# Patient Record
Sex: FEMALE | Race: WHITE | NOT HISPANIC OR LATINO | Employment: PART TIME | URBAN - METROPOLITAN AREA
[De-identification: names, ages, dates, MRNs, and addresses within clinical notes are randomized per-mention and may not be internally consistent; named-entity substitution may affect disease eponyms.]

---

## 2017-12-22 ENCOUNTER — OFFICE VISIT (OUTPATIENT)
Dept: URGENT CARE | Facility: CLINIC | Age: 52
End: 2017-12-22
Payer: COMMERCIAL

## 2017-12-22 VITALS
OXYGEN SATURATION: 98 % | SYSTOLIC BLOOD PRESSURE: 130 MMHG | TEMPERATURE: 98 F | BODY MASS INDEX: 26.46 KG/M2 | RESPIRATION RATE: 18 BRPM | HEART RATE: 78 BPM | WEIGHT: 155 LBS | DIASTOLIC BLOOD PRESSURE: 86 MMHG | HEIGHT: 64 IN

## 2017-12-22 DIAGNOSIS — J01.00 ACUTE NON-RECURRENT MAXILLARY SINUSITIS: Primary | ICD-10-CM

## 2017-12-22 PROCEDURE — 99214 OFFICE O/P EST MOD 30 MIN: CPT | Mod: 25,S$GLB,, | Performed by: SURGERY

## 2017-12-22 PROCEDURE — 96372 THER/PROPH/DIAG INJ SC/IM: CPT | Mod: S$GLB,,, | Performed by: SURGERY

## 2017-12-22 RX ORDER — PREDNISONE 20 MG/1
20 TABLET ORAL DAILY
Qty: 5 TABLET | Refills: 0 | Status: SHIPPED | OUTPATIENT
Start: 2017-12-23 | End: 2017-12-28

## 2017-12-22 RX ORDER — BETAMETHASONE SODIUM PHOSPHATE AND BETAMETHASONE ACETATE 3; 3 MG/ML; MG/ML
6 INJECTION, SUSPENSION INTRA-ARTICULAR; INTRALESIONAL; INTRAMUSCULAR; SOFT TISSUE
Status: COMPLETED | OUTPATIENT
Start: 2017-12-22 | End: 2017-12-22

## 2017-12-22 RX ORDER — AMOXICILLIN 875 MG/1
875 TABLET, FILM COATED ORAL 2 TIMES DAILY
Qty: 14 TABLET | Refills: 0 | Status: SHIPPED | OUTPATIENT
Start: 2017-12-22 | End: 2017-12-29

## 2017-12-22 RX ORDER — BROMPHENIRAMINE MALEATE, PSEUDOEPHEDRINE HYDROCHLORIDE, AND DEXTROMETHORPHAN HYDROBROMIDE 2; 30; 10 MG/5ML; MG/5ML; MG/5ML
10 SYRUP ORAL EVERY 4 HOURS PRN
Qty: 118 ML | Refills: 1 | Status: SHIPPED | OUTPATIENT
Start: 2017-12-22 | End: 2017-12-29

## 2017-12-22 RX ADMIN — BETAMETHASONE SODIUM PHOSPHATE AND BETAMETHASONE ACETATE 6 MG: 3; 3 INJECTION, SUSPENSION INTRA-ARTICULAR; INTRALESIONAL; INTRAMUSCULAR; SOFT TISSUE at 12:12

## 2017-12-22 NOTE — PATIENT INSTRUCTIONS
Sinusitis (Antibiotic Treatment)    The sinuses are air-filled spaces within the bones of the face. They connect to the inside of the nose. Sinusitis is an inflammation of the tissue lining the sinus cavity. Sinus inflammation can occur during a cold. It can also be due to allergies to pollens and other particles in the air. Sinusitis can cause symptoms of sinus congestion and fullness. A sinus infection causes fever, headache and facial pain. There is often green or yellow drainage from the nose or into the back of the throat (post-nasal drip). You have been given antibiotics to treat this condition.  Home care:  · Take the full course of antibiotics as instructed. Do not stop taking them, even if you feel better.  · Drink plenty of water, hot tea, and other liquids. This may help thin mucus. It also may promote sinus drainage.  · Heat may help soothe painful areas of the face. Use a towel soaked in hot water. Or,  the shower and direct the hot spray onto your face. Using a vaporizer along with a menthol rub at night may also help.   · An expectorant containing guaifenesin may help thin the mucus and promote drainage from the sinuses.  · Over-the-counter decongestants may be used unless a similar medicine was prescribed. Nasal sprays work the fastest. Use one that contains phenylephrine or oxymetazoline. First blow the nose gently. Then use the spray. Do not use these medicines more often than directed on the label or symptoms may get worse. You may also use tablets containing pseudoephedrine. Avoid products that combine ingredients, because side effects may be increased. Read labels. You can also ask the pharmacist for help. (NOTE: Persons with high blood pressure should not use decongestants. They can raise blood pressure.)  · Over-the-counter antihistamines may help if allergies contributed to your sinusitis.    · Do not use nasal rinses or irrigation during an acute sinus infection, unless told to by  your health care provider. Rinsing may spread the infection to other sinuses.  · Use acetaminophen or ibuprofen to control pain, unless another pain medicine was prescribed. (If you have chronic liver or kidney disease or ever had a stomach ulcer, talk with your doctor before using these medicines. Aspirin should never be used in anyone under 18 years of age who is ill with a fever. It may cause severe liver damage.)  · Don't smoke. This can worsen symptoms.  Follow-up care  Follow up with your healthcare provider or our staff if you are not improving within the next week.  When to seek medical advice  Call your healthcare provider if any of these occur:  · Facial pain or headache becoming more severe  · Stiff neck  · Unusual drowsiness or confusion  · Swelling of the forehead or eyelids  · Vision problems, including blurred or double vision  · Fever of 100.4ºF (38ºC) or higher, or as directed by your healthcare provider  · Seizure  · Breathing problems  · Symptoms not resolving within 10 days  Date Last Reviewed: 4/13/2015  © 1742-0043 [employer] [facility] [facility]. [address] [place] [address], [place] [address]. All rights reserved. This information is not intended as a substitute for professional medical care. Always follow your healthcare professional's instructions.

## 2017-12-22 NOTE — PROGRESS NOTES
"Subjective:       Patient ID: Randi Burger is a 52 y.o. female.    Vitals:  height is 5' 4" (1.626 m) and weight is 70.3 kg (155 lb). Her temperature is 98 °F (36.7 °C). Her blood pressure is 130/86 and her pulse is 78. Her respiration is 18 and oxygen saturation is 98%.     Chief Complaint: Sinus Problem    Sinus Problem   This is a new problem. The current episode started 1 to 4 weeks ago. The problem is unchanged. There has been no fever. Her pain is at a severity of 3/10. The pain is mild. Associated symptoms include congestion, coughing, headaches and a hoarse voice. Pertinent negatives include no chills, ear pain, shortness of breath or sore throat. Past treatments include oral decongestants. The treatment provided mild relief.     Review of Systems   Constitution: Positive for malaise/fatigue. Negative for chills and fever.   HENT: Positive for congestion and hoarse voice. Negative for ear pain and sore throat.    Eyes: Negative for discharge and redness.   Cardiovascular: Negative for chest pain, dyspnea on exertion and leg swelling.   Respiratory: Positive for cough and sputum production. Negative for shortness of breath and wheezing.    Musculoskeletal: Negative for myalgias.   Gastrointestinal: Negative for abdominal pain and nausea.   Neurological: Positive for headaches.       Objective:      Physical Exam   Constitutional: She is oriented to person, place, and time. She appears well-developed and well-nourished. She is cooperative.  Non-toxic appearance. She does not appear ill. No distress.   HENT:   Head: Normocephalic and atraumatic.   Right Ear: Hearing, tympanic membrane, external ear and ear canal normal.   Left Ear: Hearing, tympanic membrane, external ear and ear canal normal.   Nose: Mucosal edema and rhinorrhea present. No nasal deformity. No epistaxis. Right sinus exhibits maxillary sinus tenderness. Right sinus exhibits no frontal sinus tenderness. Left sinus exhibits maxillary sinus " tenderness. Left sinus exhibits no frontal sinus tenderness.   Mouth/Throat: Uvula is midline, oropharynx is clear and moist and mucous membranes are normal. No trismus in the jaw. Normal dentition. No uvula swelling. No posterior oropharyngeal erythema.   Purulent postnasal drip   Eyes: Conjunctivae and lids are normal. No scleral icterus.   Sclera clear bilat   Neck: Trachea normal, full passive range of motion without pain and phonation normal. Neck supple.   Cardiovascular: Normal rate, regular rhythm, normal heart sounds, intact distal pulses and normal pulses.    Pulmonary/Chest: Effort normal and breath sounds normal. No respiratory distress.   Frequent productive cough through exam     Abdominal: Soft. Normal appearance and bowel sounds are normal. She exhibits no distension. There is no tenderness.   Musculoskeletal: Normal range of motion. She exhibits no edema or deformity.   Neurological: She is alert and oriented to person, place, and time. She exhibits normal muscle tone. Coordination normal.   Skin: Skin is warm, dry and intact. She is not diaphoretic. No pallor.   Psychiatric: She has a normal mood and affect. Her speech is normal and behavior is normal. Judgment and thought content normal. Cognition and memory are normal.   Nursing note and vitals reviewed.      Assessment:       1. Acute non-recurrent maxillary sinusitis        Plan:         Acute non-recurrent maxillary sinusitis  -     betamethasone acetate-betamethasone sodium phosphate injection 6 mg; Inject 1 mL (6 mg total) into the muscle one time.  -     amoxicillin (AMOXIL) 875 MG tablet; Take 1 tablet (875 mg total) by mouth 2 (two) times daily.  Dispense: 14 tablet; Refill: 0  -     brompheniramine-pseudoeph-DM 2-30-10 mg/5 mL Syrp; Take 10 mLs by mouth every 4 (four) hours as needed.  Dispense: 118 mL; Refill: 1    Other orders  -     predniSONE (DELTASONE) 20 MG tablet; Take 1 tablet (20 mg total) by mouth once daily.  Dispense: 5  tablet; Refill: 0

## 2018-03-21 ENCOUNTER — TELEPHONE (OUTPATIENT)
Dept: FAMILY MEDICINE | Facility: CLINIC | Age: 53
End: 2018-03-21

## 2018-03-21 NOTE — TELEPHONE ENCOUNTER
----- Message from Jess Nye MA sent at 3/20/2018  3:12 PM CDT -----      ----- Message -----  From: Yvette Murillo  Sent: 3/20/2018   2:21 PM  To: Noe ARORA Staff    No. [phone]    Mrs. Burger is requesting to become a new patient of Dr. Liu.  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   Please call.

## 2018-03-29 ENCOUNTER — HOSPITAL ENCOUNTER (OUTPATIENT)
Dept: RADIOLOGY | Facility: HOSPITAL | Age: 53
Discharge: HOME | End: 2018-03-29
Attending: OBSTETRICS & GYNECOLOGY
Payer: COMMERCIAL

## 2018-03-29 ENCOUNTER — OFFICE VISIT (OUTPATIENT)
Dept: OBSTETRICS AND GYNECOLOGY | Facility: CLINIC | Age: 53
End: 2018-03-29
Payer: COMMERCIAL

## 2018-03-29 VITALS
BODY MASS INDEX: 28 KG/M2 | WEIGHT: 164 LBS | DIASTOLIC BLOOD PRESSURE: 68 MMHG | HEIGHT: 64 IN | SYSTOLIC BLOOD PRESSURE: 120 MMHG

## 2018-03-29 DIAGNOSIS — Z01.419 ENCOUNTER FOR GYNECOLOGICAL EXAMINATION: Primary | ICD-10-CM

## 2018-03-29 DIAGNOSIS — Z12.31 ENCOUNTER FOR SCREENING MAMMOGRAM FOR BREAST CANCER: ICD-10-CM

## 2018-03-29 PROCEDURE — 99999 PR PBB SHADOW E&M-EST. PATIENT-LVL II: CPT | Mod: PBBFAC,,, | Performed by: OBSTETRICS & GYNECOLOGY

## 2018-03-29 PROCEDURE — 77067 SCR MAMMO BI INCL CAD: CPT | Mod: TC

## 2018-03-29 PROCEDURE — 77063 BREAST TOMOSYNTHESIS BI: CPT | Mod: 26,,, | Performed by: RADIOLOGY

## 2018-03-29 PROCEDURE — 77067 SCR MAMMO BI INCL CAD: CPT | Mod: 26,,, | Performed by: RADIOLOGY

## 2018-03-29 PROCEDURE — 99396 PREV VISIT EST AGE 40-64: CPT | Mod: S$GLB,,, | Performed by: OBSTETRICS & GYNECOLOGY

## 2018-03-29 NOTE — PROGRESS NOTES
Subjective:       Patient ID: Randi Burger is a 52 y.o. female.    Chief Complaint:  Well Woman (mammo today )      History of Present Illness  - here for annual. Using Replens for vaginal dryness with some relief. C/o odor in bilateral groins when sweats; has used a little deodorant there with relief. Denies hot flashes/night sweats. Thinks Estrace cream may make her feel a little roach. Her coccyx was broken during a forceps delivery; patient has some pelvic/low back tightness.    History reviewed. No pertinent past medical history.    Past Surgical History:   Procedure Laterality Date    HYSTERECTOMY      @38yrs of age    TONSILLECTOMY           Current Outpatient Prescriptions:     calcium carbonate 400 mg (1,000 mg) Chew, once a day, Disp: , Rfl:     estradiol (ESTRACE) 0.01 % (0.1 mg/gram) vaginal cream, Place 1 g vaginally twice a week., Disp: 42.5 g, Rfl: 1    Review of patient's allergies indicates:  No Known Allergies    GYN & OB History  No LMP recorded (lmp unknown). Patient has had a hysterectomy.   Date of Last Pap: No result found    OB History    Para Term  AB Living   1 1 1     1   SAB TAB Ectopic Multiple Live Births           1      # Outcome Date GA Lbr Francisco J/2nd Weight Sex Delivery Anes PTL Lv   1 Term      Vag-Spont   MARRY          Social History     Social History    Marital status:      Spouse name: N/A    Number of children: N/A    Years of education: N/A     Occupational History    Not on file.     Social History Main Topics    Smoking status: Never Smoker    Smokeless tobacco: Never Used    Alcohol use Yes      Comment: socially     Drug use: No    Sexual activity: Yes     Partners: Male     Birth control/ protection: Surgical     Other Topics Concern    Not on file     Social History Narrative    No narrative on file       Family History   Problem Relation Age of Onset    Diabetes Maternal Grandmother     Coronary artery disease Father     Heart  "attack Father     Hypertension Father     Breast cancer Neg Hx     Colon cancer Neg Hx     Ovarian cancer Neg Hx        Review of Systems  Review of Systems   Respiratory: Negative for shortness of breath.    Cardiovascular: Negative for chest pain and palpitations.   Gastrointestinal: Negative for blood in stool, nausea and vomiting.   Genitourinary:        - see HPI   Skin: Negative for rash and wound.   Allergic/Immunologic: Negative for immunocompromised state.   Neurological: Negative for dizziness and syncope.   Hematological: Negative for adenopathy.   Psychiatric/Behavioral: Negative for behavioral problems.        Objective:     Vitals:    03/29/18 1018   BP: 120/68   Weight: 74.4 kg (164 lb 0.4 oz)   Height: 5' 4" (1.626 m)       Physical Exam:   Constitutional: She is oriented to person, place, and time. She appears well-developed and well-nourished.        Pulmonary/Chest: Right breast exhibits no mass, no nipple discharge, no skin change, no tenderness and no swelling. Left breast exhibits no mass, no nipple discharge, no skin change, no tenderness and no swelling. Breasts are symmetrical.        Abdominal: Soft. She exhibits no distension. There is no tenderness.     Genitourinary: Vagina normal. There is no tenderness or lesion on the right labia. There is no tenderness or lesion on the left labia. Uterus is absent. Right adnexum displays no mass, no tenderness and no fullness. Left adnexum displays no mass, no tenderness and no fullness. No vaginal discharge found. Cervix exhibits absence.           Musculoskeletal: Moves all extremeties.       Neurological: She is alert and oriented to person, place, and time.     Psychiatric: She has a normal mood and affect.        Assessment/ Plan:          Randi was seen today for well woman.    Diagnoses and all orders for this visit:    Encounter for gynecological examination    - reassured there's no vaginal discharge causing odor. Can use deodorant or a " cornstarch based powder for help with sweat/odor.  - reassured that small amounts of Estrace vaginal cream shouldn't be causing mood swings. Discussed using small amount on tip of her finger and massaging into posterior introitus once or twice weekly; patient verbalized understanding and will try it.  - discussed seeing a massage therapist or PT for low back/pelvic pain.    Follow-up in about 1 year (around 3/29/2019) for annual exam.

## 2018-04-02 RX ORDER — ESTRADIOL 0.1 MG/G
1 CREAM VAGINAL
Qty: 42.5 G | Refills: 6 | Status: SHIPPED | OUTPATIENT
Start: 2018-04-02 | End: 2019-04-12 | Stop reason: SDUPTHER

## 2018-05-21 ENCOUNTER — OFFICE VISIT (OUTPATIENT)
Dept: FAMILY MEDICINE | Facility: CLINIC | Age: 53
End: 2018-05-21
Payer: COMMERCIAL

## 2018-05-21 ENCOUNTER — LAB VISIT (OUTPATIENT)
Dept: LAB | Facility: HOSPITAL | Age: 53
End: 2018-05-21
Attending: FAMILY MEDICINE
Payer: COMMERCIAL

## 2018-05-21 VITALS
WEIGHT: 163.56 LBS | HEART RATE: 67 BPM | BODY MASS INDEX: 28.08 KG/M2 | OXYGEN SATURATION: 97 % | SYSTOLIC BLOOD PRESSURE: 118 MMHG | DIASTOLIC BLOOD PRESSURE: 70 MMHG

## 2018-05-21 DIAGNOSIS — Z23 NEED FOR DIPHTHERIA-TETANUS-PERTUSSIS (TDAP) VACCINE: ICD-10-CM

## 2018-05-21 DIAGNOSIS — Z11.59 NEED FOR HEPATITIS C SCREENING TEST: ICD-10-CM

## 2018-05-21 DIAGNOSIS — Z00.00 ANNUAL PHYSICAL EXAM: Primary | ICD-10-CM

## 2018-05-21 DIAGNOSIS — M25.50 ARTHRALGIA, UNSPECIFIED JOINT: ICD-10-CM

## 2018-05-21 DIAGNOSIS — Z12.11 ENCOUNTER FOR COLORECTAL CANCER SCREENING: ICD-10-CM

## 2018-05-21 DIAGNOSIS — Z12.12 ENCOUNTER FOR COLORECTAL CANCER SCREENING: ICD-10-CM

## 2018-05-21 DIAGNOSIS — Z00.00 ANNUAL PHYSICAL EXAM: ICD-10-CM

## 2018-05-21 LAB
ALBUMIN SERPL BCP-MCNC: 4 G/DL
ALP SERPL-CCNC: 84 U/L
ALT SERPL W/O P-5'-P-CCNC: 21 U/L
ANION GAP SERPL CALC-SCNC: 11 MMOL/L
AST SERPL-CCNC: 16 U/L
BASOPHILS # BLD AUTO: 0.02 K/UL
BASOPHILS NFR BLD: 0.3 %
BILIRUB SERPL-MCNC: 0.5 MG/DL
BUN SERPL-MCNC: 10 MG/DL
CALCIUM SERPL-MCNC: 10.1 MG/DL
CHLORIDE SERPL-SCNC: 104 MMOL/L
CHOLEST SERPL-MCNC: 174 MG/DL
CHOLEST/HDLC SERPL: 3.2 {RATIO}
CO2 SERPL-SCNC: 25 MMOL/L
CREAT SERPL-MCNC: 0.7 MG/DL
DIFFERENTIAL METHOD: ABNORMAL
EOSINOPHIL # BLD AUTO: 0.1 K/UL
EOSINOPHIL NFR BLD: 0.9 %
ERYTHROCYTE [DISTWIDTH] IN BLOOD BY AUTOMATED COUNT: 13.2 %
ERYTHROCYTE [SEDIMENTATION RATE] IN BLOOD BY WESTERGREN METHOD: 5 MM/HR
EST. GFR  (AFRICAN AMERICAN): >60 ML/MIN/1.73 M^2
EST. GFR  (NON AFRICAN AMERICAN): >60 ML/MIN/1.73 M^2
GLUCOSE SERPL-MCNC: 99 MG/DL
HCT VFR BLD AUTO: 43.2 %
HDLC SERPL-MCNC: 54 MG/DL
HDLC SERPL: 31 %
HGB BLD-MCNC: 13.8 G/DL
IMM GRANULOCYTES # BLD AUTO: 0.01 K/UL
IMM GRANULOCYTES NFR BLD AUTO: 0.1 %
LDLC SERPL CALC-MCNC: 104.6 MG/DL
LYMPHOCYTES # BLD AUTO: 1.7 K/UL
LYMPHOCYTES NFR BLD: 24.7 %
MCH RBC QN AUTO: 29.2 PG
MCHC RBC AUTO-ENTMCNC: 31.9 G/DL
MCV RBC AUTO: 91 FL
MONOCYTES # BLD AUTO: 0.5 K/UL
MONOCYTES NFR BLD: 7.9 %
NEUTROPHILS # BLD AUTO: 4.5 K/UL
NEUTROPHILS NFR BLD: 66.1 %
NONHDLC SERPL-MCNC: 120 MG/DL
NRBC BLD-RTO: 0 /100 WBC
PLATELET # BLD AUTO: 245 K/UL
PMV BLD AUTO: 11.6 FL
POTASSIUM SERPL-SCNC: 4 MMOL/L
PROT SERPL-MCNC: 7.3 G/DL
RBC # BLD AUTO: 4.73 M/UL
RHEUMATOID FACT SERPL-ACNC: 22 IU/ML
SODIUM SERPL-SCNC: 140 MMOL/L
TRIGL SERPL-MCNC: 77 MG/DL
TSH SERPL DL<=0.005 MIU/L-ACNC: 1.45 UIU/ML
WBC # BLD AUTO: 6.85 K/UL

## 2018-05-21 PROCEDURE — 86431 RHEUMATOID FACTOR QUANT: CPT

## 2018-05-21 PROCEDURE — 99386 PREV VISIT NEW AGE 40-64: CPT | Mod: 25,S$GLB,, | Performed by: FAMILY MEDICINE

## 2018-05-21 PROCEDURE — 90715 TDAP VACCINE 7 YRS/> IM: CPT | Mod: S$GLB,,, | Performed by: FAMILY MEDICINE

## 2018-05-21 PROCEDURE — 86706 HEP B SURFACE ANTIBODY: CPT

## 2018-05-21 PROCEDURE — 86038 ANTINUCLEAR ANTIBODIES: CPT

## 2018-05-21 PROCEDURE — 36415 COLL VENOUS BLD VENIPUNCTURE: CPT | Mod: PO

## 2018-05-21 PROCEDURE — 80053 COMPREHEN METABOLIC PANEL: CPT

## 2018-05-21 PROCEDURE — 99999 PR PBB SHADOW E&M-EST. PATIENT-LVL III: CPT | Mod: PBBFAC,,, | Performed by: FAMILY MEDICINE

## 2018-05-21 PROCEDURE — 84443 ASSAY THYROID STIM HORMONE: CPT

## 2018-05-21 PROCEDURE — 85025 COMPLETE CBC W/AUTO DIFF WBC: CPT

## 2018-05-21 PROCEDURE — 85651 RBC SED RATE NONAUTOMATED: CPT

## 2018-05-21 PROCEDURE — 80061 LIPID PANEL: CPT

## 2018-05-21 PROCEDURE — 86803 HEPATITIS C AB TEST: CPT

## 2018-05-21 PROCEDURE — 90471 IMMUNIZATION ADMIN: CPT | Mod: S$GLB,,, | Performed by: FAMILY MEDICINE

## 2018-05-21 RX ORDER — LANOLIN ALCOHOL/MO/W.PET/CERES
1 CREAM (GRAM) TOPICAL DAILY
COMMUNITY

## 2018-05-21 RX ORDER — MELOXICAM 7.5 MG/1
7.5 TABLET ORAL DAILY
Qty: 90 TABLET | Refills: 1 | Status: SHIPPED | OUTPATIENT
Start: 2018-05-21 | End: 2019-04-12 | Stop reason: SDUPTHER

## 2018-05-21 NOTE — PROGRESS NOTES
"Subjective:       Patient ID: Randi Burger is a 52 y.o. female.    Chief Complaint: Establish Care and Annual Exam    HPI   51 yo female presents for annual physical. 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 Pt notes that she has chronic "inflammation" which she thinks is due to arthritis. Pt s/p sacral fracture in 1996. Pain exacerbated by a fall 3 years ago. Pt notes swelling and pain of the knuckles. Also notes polyarthralgias. Has been seen by Neurology in the past and was prescribed Mobic which has improved the pain. Takes Aleve as needed.   Pt would like to have her Hep B immunity status reviewed as she works in direct care of patients.  Social History:  with a 22 year old son. Works as a respiratory therapist and Cardiology technician.  Health Maintenance: Tetanus 2006. Mammogram March 2018. Has never had colorectal cancer screening. S/p hysterectomy  Review of Systems   Constitutional: Negative for activity change, appetite change, chills, fever and unexpected weight change.   HENT: Negative for hearing loss.    Eyes: Negative for discharge and visual disturbance.        Wears glassses   Respiratory: Negative for cough, shortness of breath and wheezing.    Cardiovascular: Negative for chest pain, palpitations and leg swelling.   Gastrointestinal: Negative for abdominal pain, anal bleeding, blood in stool, constipation, diarrhea, nausea and vomiting.   Endocrine: Negative for cold intolerance, heat intolerance, polydipsia, polyphagia and polyuria.   Genitourinary: Negative for dysuria, hematuria and vaginal bleeding.   Musculoskeletal: Positive for arthralgias, joint swelling and myalgias. Negative for gait problem.   Skin: Negative for color change, rash and wound.   Allergic/Immunologic: Negative for environmental allergies, food allergies and immunocompromised state.   Neurological: Negative for dizziness, tremors, syncope, speech difficulty, weakness, light-headedness, numbness and " headaches.   Hematological: Does not bruise/bleed easily.   Psychiatric/Behavioral: Negative for dysphoric mood, sleep disturbance and suicidal ideas. The patient is not nervous/anxious.        Objective:      Physical Exam   Constitutional: She is oriented to person, place, and time. She appears well-developed and well-nourished. No distress.   HENT:   Head: Normocephalic and atraumatic.   Right Ear: Hearing, tympanic membrane, external ear and ear canal normal.   Left Ear: Hearing, tympanic membrane, external ear and ear canal normal.   Nose: Nose normal. Right sinus exhibits no maxillary sinus tenderness and no frontal sinus tenderness. Left sinus exhibits no maxillary sinus tenderness and no frontal sinus tenderness.   Mouth/Throat: Uvula is midline, oropharynx is clear and moist and mucous membranes are normal.   Eyes: Conjunctivae and EOM are normal. Pupils are equal, round, and reactive to light. Right eye exhibits no discharge. Left eye exhibits no discharge. No scleral icterus.   Neck: Normal range of motion. Neck supple. No JVD present. No thyromegaly present.   Cardiovascular: Normal rate, regular rhythm, normal heart sounds and intact distal pulses.    No murmur heard.  Pulmonary/Chest: Effort normal and breath sounds normal. She has no wheezes. She has no rales.   Abdominal: Soft. Bowel sounds are normal. She exhibits no mass. There is no tenderness.   Musculoskeletal: Normal range of motion. She exhibits no edema, tenderness or deformity.   Lymphadenopathy:     She has no cervical adenopathy.   Neurological: She is alert and oriented to person, place, and time. She displays normal reflexes. No cranial nerve deficit or sensory deficit. She exhibits normal muscle tone. Coordination normal.   Skin: Skin is warm and dry. No rash noted. She is not diaphoretic.   Psychiatric: She has a normal mood and affect.   Vitals reviewed.      Assessment:         See plan  Plan:       Randi was seen today for establish  care and annual exam.    Diagnoses and all orders for this visit:    Annual physical exam  -     Lipid panel; Future  -     Comprehensive metabolic panel; Future  -     CBC auto differential; Future  -     TSH; Future  -     Hepatitis B surface antibody; Future    BMI 28.0-28.9,adult  Weight loss advised. Dietary and exercise counseling done.    Arthralgia, unspecified joint  -     Sedimentation rate, manual; Future  -     Rheumatoid factor; Future  -     MONICA; Future  -     meloxicam (MOBIC) 7.5 MG tablet; Take 1 tablet (7.5 mg total) by mouth once daily.    Need for hepatitis C screening test  -     Hepatitis C antibody; Future    Encounter for colorectal cancer screening  -     Fecal Immunochemical Test (iFOBT); Future    Need for diphtheria-tetanus-pertussis (Tdap) vaccine  -     (In Office Administered) Tdap Vaccine    F/U in 6 months.

## 2018-05-22 LAB
ANA SER QL IF: NORMAL
HBV SURFACE AB SER-ACNC: POSITIVE M[IU]/ML
HCV AB SERPL QL IA: NEGATIVE

## 2018-05-28 ENCOUNTER — LAB VISIT (OUTPATIENT)
Dept: LAB | Facility: HOSPITAL | Age: 53
End: 2018-05-28
Attending: FAMILY MEDICINE
Payer: COMMERCIAL

## 2018-05-28 ENCOUNTER — TELEPHONE (OUTPATIENT)
Dept: FAMILY MEDICINE | Facility: CLINIC | Age: 53
End: 2018-05-28

## 2018-05-28 DIAGNOSIS — R76.8 ELEVATED RHEUMATOID FACTOR: Primary | ICD-10-CM

## 2018-05-28 DIAGNOSIS — Z12.11 ENCOUNTER FOR COLORECTAL CANCER SCREENING: ICD-10-CM

## 2018-05-28 DIAGNOSIS — Z12.12 ENCOUNTER FOR COLORECTAL CANCER SCREENING: ICD-10-CM

## 2018-05-28 LAB — HEMOCCULT STL QL IA: NEGATIVE

## 2018-05-28 PROCEDURE — 82274 ASSAY TEST FOR BLOOD FECAL: CPT

## 2018-05-28 NOTE — TELEPHONE ENCOUNTER
Left a message to discuss lab results.    Pt has elevated rheumatoid factor. Will refer to Rheumatology.

## 2018-06-04 ENCOUNTER — HOSPITAL ENCOUNTER (OUTPATIENT)
Dept: RADIOLOGY | Facility: HOSPITAL | Age: 53
Discharge: HOME | End: 2018-06-04
Attending: INTERNAL MEDICINE
Payer: COMMERCIAL

## 2018-06-04 ENCOUNTER — PATIENT MESSAGE (OUTPATIENT)
Dept: RHEUMATOLOGY | Facility: CLINIC | Age: 53
End: 2018-06-04

## 2018-06-04 ENCOUNTER — INITIAL CONSULT (OUTPATIENT)
Dept: RHEUMATOLOGY | Facility: CLINIC | Age: 53
End: 2018-06-04
Attending: INTERNAL MEDICINE
Payer: COMMERCIAL

## 2018-06-04 VITALS
WEIGHT: 163.38 LBS | HEIGHT: 60 IN | BODY MASS INDEX: 32.08 KG/M2 | HEART RATE: 69 BPM | DIASTOLIC BLOOD PRESSURE: 74 MMHG | RESPIRATION RATE: 18 BRPM | SYSTOLIC BLOOD PRESSURE: 127 MMHG

## 2018-06-04 DIAGNOSIS — R76.8 RHEUMATOID FACTOR POSITIVE: ICD-10-CM

## 2018-06-04 DIAGNOSIS — R76.8 RHEUMATOID FACTOR POSITIVE: Primary | ICD-10-CM

## 2018-06-04 PROCEDURE — 99999 PR PBB SHADOW E&M-EST. PATIENT-LVL III: CPT | Mod: PBBFAC,,, | Performed by: INTERNAL MEDICINE

## 2018-06-04 PROCEDURE — 99204 OFFICE O/P NEW MOD 45 MIN: CPT | Mod: S$GLB,,, | Performed by: INTERNAL MEDICINE

## 2018-06-04 PROCEDURE — 3008F BODY MASS INDEX DOCD: CPT | Mod: CPTII,S$GLB,, | Performed by: INTERNAL MEDICINE

## 2018-06-04 PROCEDURE — 77077 JOINT SURVEY SINGLE VIEW: CPT | Mod: 26,,, | Performed by: RADIOLOGY

## 2018-06-04 PROCEDURE — 77077 JOINT SURVEY SINGLE VIEW: CPT | Mod: TC

## 2018-06-04 NOTE — LETTER
June 4, 2018      Hanny Liu MD  [address] [facility] [address]           [facility] - Rheumatology  [address]  [place] [address]  Phone: [phone]  Fax: [phone]          Patient: Randi Burger   MR Number: [id number]   YOB: 1965   Date of Visit: 6/4/2018       Dear Dr. Hanny Liu:    Thank you for referring Randi Burger to me for evaluation. Attached you will find relevant portions of my assessment and plan of care.    If you have questions, please do not hesitate to call me. I look forward to following Randi Burger along with you.    Sincerely,    Marisa Horan MD    Enclosure  CC:  No Recipients    If you would like to receive this communication electronically, please contact [email or web address] or [phone] to request more information on [employer] access.    For providers and/or their staff who would like to refer a patient to [facility], please contact us through our one-stop-shop provider referral line, [facility], at [phone].    If you feel you have received this communication in error or would no longer like to receive these types of communications, please e-mail [email or web address]

## 2018-06-04 NOTE — PROGRESS NOTES
Subjective:       Patient ID: Randi Burger is a 52 y.o. female.    Chief Complaint: Disease Management    HPI     53 yo F with PMH of anxiety sacral bone fracture at age 30 here for evaluation.   Reports that she has pain in arms and legs and buttock for 3 years.  Reports that she has pain in wrists, hands, ankles for a few year.  Reports that her her pain worsened after she had a flu shot.  She has episodes of fatigue on occasion. Pain level is about  3/10 today but can be as high as 7/10.  Pain in joints is aching and in muscles in burning.   Hands get swollen on occasion. Wrists also gets swelling. Ankles also get swollen.  Denies rashes, oral ulcers, fevers, photosensitivity, pleurisy, or raynauds.   Mobic improves the pain.   Has tried muscle relaxants but it gives her bladder infections.        Review of Systems   Constitutional: Negative for activity change, appetite change, chills, diaphoresis, fatigue, fever and unexpected weight change.   HENT: Negative for congestion, ear discharge, ear pain, facial swelling, mouth sores, sinus pressure, sneezing, sore throat, tinnitus and trouble swallowing.    Eyes: Negative for photophobia, pain, discharge, redness, itching and visual disturbance.   Respiratory: Negative for apnea, cough, chest tightness, shortness of breath, wheezing and stridor.    Cardiovascular: Negative for chest pain and leg swelling.   Gastrointestinal: Negative for abdominal distention, abdominal pain, anal bleeding, blood in stool, constipation and nausea.   Endocrine: Negative for cold intolerance and heat intolerance.   Genitourinary: Negative for difficulty urinating, dysuria and genital sores.   Musculoskeletal: Positive for arthralgias. Negative for back pain, gait problem, joint swelling, myalgias, neck pain and neck stiffness.   Skin: Negative for color change, pallor, rash and wound.   Neurological: Positive for headaches. Negative for dizziness, seizures, light-headedness and  numbness.   Hematological: Negative for adenopathy. Does not bruise/bleed easily.   Psychiatric/Behavioral: Negative for sleep disturbance. The patient is not nervous/anxious.            Objective:   /74   Pulse 69   Resp 18   Ht 5' (1.524 m)   Wt 74.1 kg (163 lb 6.4 oz)   LMP  (LMP Unknown)   BMI 31.91 kg/m²      Physical Exam   Constitutional: She is oriented to person, place, and time.   HENT:   Head: Normocephalic and atraumatic.   Right Ear: External ear normal.   Left Ear: External ear normal.   Nose: Nose normal.   Mouth/Throat: Oropharynx is clear and moist. No oropharyngeal exudate.   Eyes: Conjunctivae and EOM are normal. Pupils are equal, round, and reactive to light. Right eye exhibits no discharge. Left eye exhibits no discharge. No scleral icterus.   Neck: Neck supple. No JVD present. No thyromegaly present.   Cardiovascular: Normal rate, regular rhythm, normal heart sounds and intact distal pulses.  Exam reveals no gallop and no friction rub.    No murmur heard.  Pulmonary/Chest: Effort normal and breath sounds normal. No respiratory distress. She has no wheezes. She has no rales. She exhibits no tenderness.   Abdominal: Soft. Bowel sounds are normal. She exhibits no distension and no mass. There is no tenderness. There is no rebound and no guarding.   Lymphadenopathy:     She has no cervical adenopathy.   Neurological: She is alert and oriented to person, place, and time. No cranial nerve deficit. Gait normal. Coordination normal.   Skin: Skin is dry. No rash noted. No erythema. No pallor.     Psychiatric: Affect and judgment normal.   Musculoskeletal: She exhibits no edema, tenderness or deformity.   FROM of all joints including neck, shoulders, spine, ankles, wrists, knees, and ankles; no joint deformities noted or effusions, erythema or warmth; no tophi or nodules noted; no crepitus; no synovitis noted in hands or feet; no nail pitting or onycholysis         diffuse tender out of  proportion to exam         Assessment:     53 yo F with PMH of anxiety sacral bone fracture at age 30 here for evaluation.      1) Fibromyalgia: Patient with diffuse body pain and diffuse tender points. Discussed treatment of fibromyalgia.  Patient will try exercise and weight loss as she is not interested in additional pills.    2) +RF: on exam, I do not detect synovitis. Told patient I would get additional studies and xrays but have low suspicion.  Labs  xrays    3)obesity:  Encourage weight loss

## 2018-08-14 ENCOUNTER — OFFICE VISIT (OUTPATIENT)
Dept: INTERNAL MEDICINE | Facility: CLINIC | Age: 53
End: 2018-08-14
Payer: COMMERCIAL

## 2018-08-14 VITALS
WEIGHT: 163.56 LBS | HEART RATE: 84 BPM | SYSTOLIC BLOOD PRESSURE: 94 MMHG | OXYGEN SATURATION: 99 % | BODY MASS INDEX: 32.11 KG/M2 | DIASTOLIC BLOOD PRESSURE: 60 MMHG | HEIGHT: 60 IN

## 2018-08-14 DIAGNOSIS — R39.89 SUSPECTED UTI: Primary | ICD-10-CM

## 2018-08-14 LAB
BACTERIA #/AREA URNS AUTO: NORMAL /HPF
BILIRUB UR QL STRIP: ABNORMAL
CLARITY UR: ABNORMAL
COLOR UR: ABNORMAL
GLUCOSE UR QL STRIP: NEGATIVE
HGB UR QL STRIP: ABNORMAL
KETONES UR QL STRIP: NEGATIVE
LEUKOCYTE ESTERASE UR QL STRIP: ABNORMAL
MICROSCOPIC COMMENT: NORMAL
NITRITE UR QL STRIP: ABNORMAL
PH UR STRIP: 5 [PH] (ref 5–8)
PROT UR QL STRIP: ABNORMAL
RBC #/AREA URNS AUTO: 0 /HPF (ref 0–4)
SP GR UR STRIP: 1.01 (ref 1–1.03)
SQUAMOUS #/AREA URNS AUTO: 2 /HPF
URN SPEC COLLECT METH UR: ABNORMAL
UROBILINOGEN UR STRIP-ACNC: ABNORMAL EU/DL
WBC #/AREA URNS AUTO: 3 /HPF (ref 0–5)

## 2018-08-14 PROCEDURE — 99213 OFFICE O/P EST LOW 20 MIN: CPT | Mod: S$GLB,,, | Performed by: INTERNAL MEDICINE

## 2018-08-14 PROCEDURE — 81000 URINALYSIS NONAUTO W/SCOPE: CPT | Mod: PO

## 2018-08-14 PROCEDURE — 99999 PR PBB SHADOW E&M-EST. PATIENT-LVL IV: CPT | Mod: PBBFAC,,, | Performed by: INTERNAL MEDICINE

## 2018-08-14 PROCEDURE — 3008F BODY MASS INDEX DOCD: CPT | Mod: CPTII,S$GLB,, | Performed by: INTERNAL MEDICINE

## 2018-08-14 RX ORDER — CIPROFLOXACIN 250 MG/1
250 TABLET, FILM COATED ORAL EVERY 12 HOURS
Qty: 6 TABLET | Refills: 0 | Status: SHIPPED | OUTPATIENT
Start: 2018-08-14 | End: 2018-08-17

## 2018-08-14 RX ORDER — CIPROFLOXACIN 250 MG/1
250 TABLET, FILM COATED ORAL EVERY 12 HOURS
Qty: 6 TABLET | Refills: 0 | Status: SHIPPED | OUTPATIENT
Start: 2018-08-14 | End: 2018-08-14 | Stop reason: SDUPTHER

## 2018-08-14 NOTE — PATIENT INSTRUCTIONS
Ciprofloxacin tablets  What is this medicine?  CIPROFLOXACIN (sip juan FLOX a sin) is a quinolone antibiotic. It is used to treat certain kinds of bacterial infections. It will not work for colds, flu, or other viral infections.  How should I use this medicine?  Take this medicine by mouth with a glass of water. Follow the directions on the prescription label. Take your medicine at regular intervals. Do not take your medicine more often than directed. Take all of your medicine as directed even if you think your are better. Do not skip doses or stop your medicine early.  You can take this medicine with food or on an empty stomach. It can be taken with a meal that contains dairy or calcium, but do not take it alone with a dairy product, like milk or yogurt or calcium-fortified juice.  A special MedGuide will be given to you by the pharmacist with each prescription and refill. Be sure to read this information carefully each time.  Talk to your pediatrician regarding the use of this medicine in children. Special care may be needed.  What side effects may I notice from receiving this medicine?  Side effects that you should report to your doctor or health care professional as soon as possible:  · allergic reactions like skin rash or hives, swelling of the face, lips, or tongue  · anxious  · confusion  · depressed mood  · diarrhea  · fast, irregular heartbeat  · hallucination, loss of contact with reality  · joint, muscle, or tendon pain or swelling  · pain, tingling, numbness in the hands or feet  · suicidal thoughts or other mood changes  · sunburn  · unusually weak or tired  Side effects that usually do not require medical attention (report to your doctor or health care professional if they continue or are bothersome):  · dry mouth  · headache  · nausea  · trouble sleeping  What may interact with this medicine?  Do not take this medicine with any of the following  medications:  · cisapride  · droperidol  · terfenadine  · tizanidine  This medicine may also interact with the following medications:  · antacids  · birth control pills  · caffeine  · cyclosporin  · didanosine (ddI) buffered tablets or powder  · medicines for diabetes  · medicines for inflammation like ibuprofen, naproxen  · methotrexate  · multivitamins  · omeprazole  · phenytoin  · probenecid  · sucralfate  · theophylline  · warfarin  What if I miss a dose?  If you miss a dose, take it as soon as you can. If it is almost time for your next dose, take only that dose. Do not take double or extra doses.  Where should I keep my medicine?  Keep out of the reach of children.  Store at room temperature below 30 degrees C (86 degrees F). Keep container tightly closed. Throw away any unused medicine after the expiration date.  What should I tell my health care provider before I take this medicine?  They need to know if you have any of these conditions:  · bone problems  · cerebral disease  · history of low levels of potassium in the blood  · joint problems  · irregular heartbeat  · kidney disease  · myasthenia gravis  · seizures  · tendon problems  · tingling of the fingers or toes, or other nerve disorder  · an unusual or allergic reaction to ciprofloxacin, other antibiotics or medicines, foods, dyes, or preservatives  · pregnant or trying to get pregnant  · breast-feeding  What should I watch for while using this medicine?  Tell your doctor or health care professional if your symptoms do not improve.  Do not treat diarrhea with over the counter products. Contact your doctor if you have diarrhea that lasts more than 2 days or if it is severe and watery.  You may get drowsy or dizzy. Do not drive, use machinery, or do anything that needs mental alertness until you know how this medicine affects you. Do not stand or sit up quickly, especially if you are an older patient. This reduces the risk of dizzy or fainting  spells.  This medicine can make you more sensitive to the sun. Keep out of the sun. If you cannot avoid being in the sun, wear protective clothing and use sunscreen. Do not use sun lamps or tanning beds/booths.  Avoid antacids, aluminum, calcium, iron, magnesium, and zinc products for 6 hours before and 2 hours after taking a dose of this medicine.  NOTE:This sheet is a summary. It may not cover all possible information. If you have questions about this medicine, talk to your doctor, pharmacist, or health care provider. Copyright© 2017 [employer]

## 2018-08-14 NOTE — PROGRESS NOTES
Subjective:       Patient ID: Randi Burger is a 52 y.o. female.    Chief Complaint: Urinary Tract Infection (x one week)    HPI Mrs. Burger is a 52 year old female who presents with a chief complaint of urinary tract infection.  She says that over the weekend she was drinking citric.  She also had sex and had 2 margaritas.  She is wondering if this may have been what started her urinary tract infection.  Her symptoms worsened and became really bad last night.  She passed a blood clot once.  She did not have any further episodes of bleeding.  She describes an achy type pain in the lower pelvic region.  She also feels some irritation in the lower back.  She had a possible fever last night (she did not check a temperature).  She took some Tylenol and thinks this may have helped some.  She has worsening of pain with urination.  She also has associated urinary frequency and urgency.  Symptoms are constant and worsening.    Review of Systems   Constitutional: Positive for fever (possible fever).   Genitourinary: Positive for dysuria, frequency, hematuria, pelvic pain and urgency.       Objective:      Physical Exam   Constitutional: She is oriented to person, place, and time. She appears well-developed and well-nourished. No distress.   HENT:   Head: Normocephalic and atraumatic.   Eyes: Conjunctivae and EOM are normal.   Cardiovascular: Normal rate, regular rhythm, normal heart sounds and intact distal pulses. Exam reveals no gallop and no friction rub.   No murmur heard.  Pulmonary/Chest: Effort normal and breath sounds normal. No stridor. No respiratory distress. She has no wheezes. She has no rales.   Abdominal: Soft. Bowel sounds are normal. She exhibits no distension and no mass. There is no tenderness. There is no rebound and no guarding.   Neurological: She is alert and oriented to person, place, and time.   Skin: Skin is warm and dry. She is not diaphoretic.   Psychiatric: She has a normal mood and  affect. Her behavior is normal. Judgment and thought content normal.   Vitals reviewed.      Assessment:       1. Suspected UTI        Plan:     1.  Suspected urinary tract infection  Urinalysis and urine culture pending. Unable to get dipstick UA due to recent use of AZO  Empirically treating with ciprofloxacin 250 mg p.o. b.i.d. x3 days  Continue OTC AZO as needed for pain relief  Continue increased intake of water    RTC PRN

## 2019-03-12 ENCOUNTER — TELEPHONE (OUTPATIENT)
Dept: OBSTETRICS AND GYNECOLOGY | Facility: CLINIC | Age: 54
End: 2019-03-12

## 2019-03-12 DIAGNOSIS — Z12.31 ENCOUNTER FOR SCREENING MAMMOGRAM FOR MALIGNANT NEOPLASM OF BREAST: Primary | ICD-10-CM

## 2019-03-12 NOTE — TELEPHONE ENCOUNTER
Pt has annual scheduled on 4/12 and would like mammo orders entered so she can have it done at [facility] before her appt.

## 2019-04-04 ENCOUNTER — HOSPITAL ENCOUNTER (OUTPATIENT)
Dept: RADIOLOGY | Facility: HOSPITAL | Age: 54
Discharge: HOME | End: 2019-04-04
Attending: OBSTETRICS & GYNECOLOGY
Payer: COMMERCIAL

## 2019-04-04 DIAGNOSIS — Z12.31 ENCOUNTER FOR SCREENING MAMMOGRAM FOR MALIGNANT NEOPLASM OF BREAST: ICD-10-CM

## 2019-04-04 PROCEDURE — 77067 SCR MAMMO BI INCL CAD: CPT | Mod: 26,,, | Performed by: RADIOLOGY

## 2019-04-04 PROCEDURE — 77067 MAMMO DIGITAL SCREENING BILAT WITH TOMOSYNTHESIS_CAD: ICD-10-PCS | Mod: 26,,, | Performed by: RADIOLOGY

## 2019-04-04 PROCEDURE — 77063 BREAST TOMOSYNTHESIS BI: CPT | Mod: 26,,, | Performed by: RADIOLOGY

## 2019-04-04 PROCEDURE — 77063 MAMMO DIGITAL SCREENING BILAT WITH TOMOSYNTHESIS_CAD: ICD-10-PCS | Mod: 26,,, | Performed by: RADIOLOGY

## 2019-04-04 PROCEDURE — 77067 SCR MAMMO BI INCL CAD: CPT | Mod: TC

## 2019-04-12 ENCOUNTER — OFFICE VISIT (OUTPATIENT)
Dept: OBSTETRICS AND GYNECOLOGY | Facility: CLINIC | Age: 54
End: 2019-04-12
Payer: COMMERCIAL

## 2019-04-12 VITALS
SYSTOLIC BLOOD PRESSURE: 112 MMHG | BODY MASS INDEX: 31.62 KG/M2 | DIASTOLIC BLOOD PRESSURE: 72 MMHG | WEIGHT: 161.06 LBS | HEIGHT: 60 IN

## 2019-04-12 DIAGNOSIS — Z01.419 ENCOUNTER FOR GYNECOLOGICAL EXAMINATION: Primary | ICD-10-CM

## 2019-04-12 DIAGNOSIS — M25.50 ARTHRALGIA, UNSPECIFIED JOINT: ICD-10-CM

## 2019-04-12 PROCEDURE — 99396 PR PREVENTIVE VISIT,EST,40-64: ICD-10-PCS | Mod: S$GLB,,, | Performed by: OBSTETRICS & GYNECOLOGY

## 2019-04-12 PROCEDURE — 99999 PR PBB SHADOW E&M-EST. PATIENT-LVL III: CPT | Mod: PBBFAC,,, | Performed by: OBSTETRICS & GYNECOLOGY

## 2019-04-12 PROCEDURE — 99396 PREV VISIT EST AGE 40-64: CPT | Mod: S$GLB,,, | Performed by: OBSTETRICS & GYNECOLOGY

## 2019-04-12 PROCEDURE — 99999 PR PBB SHADOW E&M-EST. PATIENT-LVL III: ICD-10-PCS | Mod: PBBFAC,,, | Performed by: OBSTETRICS & GYNECOLOGY

## 2019-04-12 RX ORDER — MELOXICAM 7.5 MG/1
7.5 TABLET ORAL DAILY
Qty: 90 TABLET | Refills: 3 | Status: SHIPPED | OUTPATIENT
Start: 2019-04-12 | End: 2020-11-05 | Stop reason: SDUPTHER

## 2019-04-12 RX ORDER — ESTRADIOL 0.1 MG/G
1 CREAM VAGINAL
Qty: 42.5 G | Refills: 6 | Status: SHIPPED | OUTPATIENT
Start: 2019-04-15 | End: 2021-07-12 | Stop reason: SDUPTHER

## 2019-04-12 NOTE — PROGRESS NOTES
Subjective:       Patient ID: Randi Burger is a 53 y.o. female.    Chief Complaint:  Well Woman (Last mammo 2019 birads 1, report last dexa 2016, normal )      History of Present Illness  - here for annual. No Gyn complaints. Still with issues with sacrum and tailbone pain. Mobic is helping.    History reviewed. No pertinent past medical history.    Past Surgical History:   Procedure Laterality Date    HYSTERECTOMY      @38yrs of age    TONSILLECTOMY           Current Outpatient Medications:     calcium citrate-vitamin D3 315-200 mg (CITRACAL+D) 315-200 mg-unit per tablet, Take 1 tablet by mouth once daily., Disp: , Rfl:     meloxicam (MOBIC) 7.5 MG tablet, Take 1 tablet (7.5 mg total) by mouth once daily., Disp: 90 tablet, Rfl: 3    [START ON 4/15/2019] estradiol (ESTRACE) 0.01 % (0.1 mg/gram) vaginal cream, Place 1 gram vaginally twice a week., Disp: 42.5 g, Rfl: 6    Review of patient's allergies indicates:  No Known Allergies    GYN & OB History  No LMP recorded (lmp unknown). Patient has had a hysterectomy.   Date of Last Pap: No result found    OB History    Para Term  AB Living   1 1 1     1   SAB TAB Ectopic Multiple Live Births           1      # Outcome Date GA Lbr Francisco J/2nd Weight Sex Delivery Anes PTL Lv   1 Term      Vag-Spont   MARRY       Social History     Socioeconomic History    Marital status:      Spouse name: Not on file    Number of children: Not on file    Years of education: Not on file    Highest education level: Not on file   Occupational History    Not on file   Social Needs    Financial resource strain: Not on file    Food insecurity:     Worry: Not on file     Inability: Not on file    Transportation needs:     Medical: Not on file     Non-medical: Not on file   Tobacco Use    Smoking status: Never Smoker    Smokeless tobacco: Never Used   Substance and Sexual Activity    Alcohol use: Yes     Comment: socially     Drug use: No    Sexual  activity: Yes     Partners: Male     Birth control/protection: Surgical   Lifestyle    Physical activity:     Days per week: Not on file     Minutes per session: Not on file    Stress: Not on file   Relationships    Social connections:     Talks on phone: Not on file     Gets together: Not on file     Attends [employer] service: Not on file     Active member of club or organization: Not on file     Attends meetings of clubs or organizations: Not on file     Relationship status: Not on file   Other Topics Concern    Not on file   Social History Narrative    Not on file       Family History   Problem Relation Age of Onset    Diabetes Maternal Grandmother     Coronary artery disease Father     Heart attack Father     Hypertension Father     Breast cancer Neg Hx     Colon cancer Neg Hx     Ovarian cancer Neg Hx        Review of Systems  Review of Systems   Respiratory: Negative for shortness of breath.    Cardiovascular: Negative for chest pain and palpitations.   Gastrointestinal: Negative for blood in stool, nausea and vomiting.   Genitourinary:        - see HPI   Skin: Negative for rash and wound.   Allergic/Immunologic: Negative for immunocompromised state.   Neurological: Negative for dizziness and syncope.   Hematological: Negative for adenopathy.   Psychiatric/Behavioral: Negative for behavioral problems.        Objective:     Vitals:    04/12/19 1137   BP: 112/72   Weight: 73.1 kg (161 lb 0.7 oz)   Height: 5' (1.524 m)       Physical Exam:   Constitutional: She is oriented to person, place, and time. She appears well-developed and well-nourished.        Pulmonary/Chest: Right breast exhibits no mass, no nipple discharge, no skin change, no tenderness and no swelling. Left breast exhibits no mass, no nipple discharge, no skin change, no tenderness and no swelling. Breasts are symmetrical.        Abdominal: Soft. She exhibits no distension. There is no tenderness.     Genitourinary: Vagina normal and  uterus normal. There is no tenderness or lesion on the right labia. There is no tenderness or lesion on the left labia. Cervix is normal. Right adnexum displays no mass, no tenderness and no fullness. Left adnexum displays no mass, no tenderness and no fullness. No vaginal discharge found.           Musculoskeletal: Moves all extremeties.       Neurological: She is alert and oriented to person, place, and time.     Psychiatric: She has a normal mood and affect.        Assessment/ Plan:     Orders Placed This Encounter    estradiol (ESTRACE) 0.01 % (0.1 mg/gram) vaginal cream    meloxicam (MOBIC) 7.5 MG tablet       Randi was seen today for well woman.    Diagnoses and all orders for this visit:    Encounter for gynecological examination    Arthralgia, unspecified joint  -     meloxicam (MOBIC) 7.5 MG tablet; Take 1 tablet (7.5 mg total) by mouth once daily.    Other orders  -     estradiol (ESTRACE) 0.01 % (0.1 mg/gram) vaginal cream; Place 1 gram vaginally twice a week.        Follow up in about 1 year (around 4/12/2020) for annual exam.

## 2019-05-09 ENCOUNTER — CLINICAL SUPPORT (OUTPATIENT)
Dept: INTERNAL MEDICINE | Facility: CLINIC | Age: 54
End: 2019-05-09
Payer: COMMERCIAL

## 2019-05-09 DIAGNOSIS — Z00.00 ROUTINE GENERAL MEDICAL EXAMINATION AT A HEALTH CARE FACILITY: Primary | ICD-10-CM

## 2019-05-09 PROCEDURE — 99412 PR PREVENT COUNSEL,GROUP,60 MIN: ICD-10-PCS | Mod: S$GLB,,, | Performed by: DIETITIAN, REGISTERED

## 2019-05-09 PROCEDURE — 99412 PREVENTIVE COUNSELING GROUP: CPT | Mod: S$GLB,,, | Performed by: DIETITIAN, REGISTERED

## 2019-05-16 ENCOUNTER — CLINICAL SUPPORT (OUTPATIENT)
Dept: INTERNAL MEDICINE | Facility: CLINIC | Age: 54
End: 2019-05-16
Payer: COMMERCIAL

## 2019-05-16 DIAGNOSIS — Z00.00 ROUTINE GENERAL MEDICAL EXAMINATION AT A HEALTH CARE FACILITY: Primary | ICD-10-CM

## 2019-05-16 PROCEDURE — 99412 PR PREVENT COUNSEL,GROUP,60 MIN: ICD-10-PCS | Mod: S$GLB,,, | Performed by: DIETITIAN, REGISTERED

## 2019-05-16 PROCEDURE — 99412 PREVENTIVE COUNSELING GROUP: CPT | Mod: S$GLB,,, | Performed by: DIETITIAN, REGISTERED

## 2019-05-23 ENCOUNTER — CLINICAL SUPPORT (OUTPATIENT)
Dept: INTERNAL MEDICINE | Facility: CLINIC | Age: 54
End: 2019-05-23
Payer: COMMERCIAL

## 2019-05-23 DIAGNOSIS — Z00.00 ROUTINE GENERAL MEDICAL EXAMINATION AT A HEALTH CARE FACILITY: Primary | ICD-10-CM

## 2019-05-23 PROCEDURE — 99412 PR PREVENT COUNSEL,GROUP,60 MIN: ICD-10-PCS | Mod: S$GLB,,, | Performed by: DIETITIAN, REGISTERED

## 2019-05-23 PROCEDURE — 99412 PREVENTIVE COUNSELING GROUP: CPT | Mod: S$GLB,,, | Performed by: DIETITIAN, REGISTERED

## 2019-05-30 ENCOUNTER — CLINICAL SUPPORT (OUTPATIENT)
Dept: INTERNAL MEDICINE | Facility: CLINIC | Age: 54
End: 2019-05-30
Payer: COMMERCIAL

## 2019-05-30 DIAGNOSIS — Z00.00 ROUTINE GENERAL MEDICAL EXAMINATION AT A HEALTH CARE FACILITY: Primary | ICD-10-CM

## 2019-05-30 PROCEDURE — 99412 PREVENTIVE COUNSELING GROUP: CPT | Mod: S$GLB,,, | Performed by: DIETITIAN, REGISTERED

## 2019-05-30 PROCEDURE — 99412 PR PREVENT COUNSEL,GROUP,60 MIN: ICD-10-PCS | Mod: S$GLB,,, | Performed by: DIETITIAN, REGISTERED

## 2019-06-06 ENCOUNTER — CLINICAL SUPPORT (OUTPATIENT)
Dept: INTERNAL MEDICINE | Facility: CLINIC | Age: 54
End: 2019-06-06
Payer: COMMERCIAL

## 2019-06-06 DIAGNOSIS — Z00.00 ROUTINE GENERAL MEDICAL EXAMINATION AT A HEALTH CARE FACILITY: Primary | ICD-10-CM

## 2019-06-06 PROCEDURE — 99412 PR PREVENT COUNSEL,GROUP,60 MIN: ICD-10-PCS | Mod: S$GLB,,, | Performed by: DIETITIAN, REGISTERED

## 2019-06-06 PROCEDURE — 99412 PREVENTIVE COUNSELING GROUP: CPT | Mod: S$GLB,,, | Performed by: DIETITIAN, REGISTERED

## 2019-06-13 ENCOUNTER — CLINICAL SUPPORT (OUTPATIENT)
Dept: INTERNAL MEDICINE | Facility: CLINIC | Age: 54
End: 2019-06-13
Payer: COMMERCIAL

## 2019-06-13 DIAGNOSIS — Z00.00 ROUTINE GENERAL MEDICAL EXAMINATION AT A HEALTH CARE FACILITY: Primary | ICD-10-CM

## 2019-06-13 PROCEDURE — 99412 PREVENTIVE COUNSELING GROUP: CPT | Mod: S$GLB,,, | Performed by: DIETITIAN, REGISTERED

## 2019-06-13 PROCEDURE — 99412 PR PREVENT COUNSEL,GROUP,60 MIN: ICD-10-PCS | Mod: S$GLB,,, | Performed by: DIETITIAN, REGISTERED

## 2019-06-20 ENCOUNTER — CLINICAL SUPPORT (OUTPATIENT)
Dept: INTERNAL MEDICINE | Facility: CLINIC | Age: 54
End: 2019-06-20
Payer: COMMERCIAL

## 2019-06-20 DIAGNOSIS — Z00.00 ROUTINE GENERAL MEDICAL EXAMINATION AT A HEALTH CARE FACILITY: Primary | ICD-10-CM

## 2019-06-20 PROCEDURE — 99412 PREVENTIVE COUNSELING GROUP: CPT | Mod: S$GLB,,, | Performed by: DIETITIAN, REGISTERED

## 2019-06-20 PROCEDURE — 99412 PR PREVENT COUNSEL,GROUP,60 MIN: ICD-10-PCS | Mod: S$GLB,,, | Performed by: DIETITIAN, REGISTERED

## 2019-06-27 ENCOUNTER — CLINICAL SUPPORT (OUTPATIENT)
Dept: INTERNAL MEDICINE | Facility: CLINIC | Age: 54
End: 2019-06-27
Payer: COMMERCIAL

## 2019-06-27 DIAGNOSIS — Z00.00 ROUTINE GENERAL MEDICAL EXAMINATION AT A HEALTH CARE FACILITY: Primary | ICD-10-CM

## 2019-06-27 PROCEDURE — 99412 PR PREVENT COUNSEL,GROUP,60 MIN: ICD-10-PCS | Mod: S$GLB,,, | Performed by: DIETITIAN, REGISTERED

## 2019-06-27 PROCEDURE — 99412 PREVENTIVE COUNSELING GROUP: CPT | Mod: S$GLB,,, | Performed by: DIETITIAN, REGISTERED

## 2019-07-11 ENCOUNTER — CLINICAL SUPPORT (OUTPATIENT)
Dept: INTERNAL MEDICINE | Facility: CLINIC | Age: 54
End: 2019-07-11
Payer: COMMERCIAL

## 2019-07-11 DIAGNOSIS — Z00.00 ROUTINE GENERAL MEDICAL EXAMINATION AT A HEALTH CARE FACILITY: Primary | ICD-10-CM

## 2019-07-11 PROCEDURE — 99412 PR PREVENT COUNSEL,GROUP,60 MIN: ICD-10-PCS | Mod: S$GLB,,, | Performed by: INTERNAL MEDICINE

## 2019-07-11 PROCEDURE — 99412 PREVENTIVE COUNSELING GROUP: CPT | Mod: S$GLB,,, | Performed by: INTERNAL MEDICINE

## 2019-07-18 ENCOUNTER — CLINICAL SUPPORT (OUTPATIENT)
Dept: INTERNAL MEDICINE | Facility: CLINIC | Age: 54
End: 2019-07-18
Payer: COMMERCIAL

## 2019-07-18 DIAGNOSIS — Z00.00 ROUTINE GENERAL MEDICAL EXAMINATION AT A HEALTH CARE FACILITY: Primary | ICD-10-CM

## 2019-07-18 PROCEDURE — 99412 PREVENTIVE COUNSELING GROUP: CPT | Mod: S$GLB,,, | Performed by: DIETITIAN, REGISTERED

## 2019-07-18 PROCEDURE — 99412 PR PREVENT COUNSEL,GROUP,60 MIN: ICD-10-PCS | Mod: S$GLB,,, | Performed by: DIETITIAN, REGISTERED

## 2019-07-25 ENCOUNTER — CLINICAL SUPPORT (OUTPATIENT)
Dept: INTERNAL MEDICINE | Facility: CLINIC | Age: 54
End: 2019-07-25
Payer: COMMERCIAL

## 2019-07-25 DIAGNOSIS — Z00.00 ROUTINE GENERAL MEDICAL EXAMINATION AT A HEALTH CARE FACILITY: Primary | ICD-10-CM

## 2019-07-25 PROCEDURE — 99412 PREVENTIVE COUNSELING GROUP: CPT | Mod: S$GLB,,, | Performed by: DIETITIAN, REGISTERED

## 2019-07-25 PROCEDURE — 99412 PR PREVENT COUNSEL,GROUP,60 MIN: ICD-10-PCS | Mod: S$GLB,,, | Performed by: DIETITIAN, REGISTERED

## 2019-08-01 ENCOUNTER — CLINICAL SUPPORT (OUTPATIENT)
Dept: INTERNAL MEDICINE | Facility: CLINIC | Age: 54
End: 2019-08-01
Payer: COMMERCIAL

## 2019-08-01 DIAGNOSIS — Z00.00 ROUTINE GENERAL MEDICAL EXAMINATION AT A HEALTH CARE FACILITY: Primary | ICD-10-CM

## 2019-08-01 PROCEDURE — 99412 PREVENTIVE COUNSELING GROUP: CPT | Mod: S$GLB,,, | Performed by: DIETITIAN, REGISTERED

## 2019-08-01 PROCEDURE — 99412 PR PREVENT COUNSEL,GROUP,60 MIN: ICD-10-PCS | Mod: S$GLB,,, | Performed by: DIETITIAN, REGISTERED

## 2019-09-24 ENCOUNTER — PATIENT OUTREACH (OUTPATIENT)
Dept: ADMINISTRATIVE | Facility: OTHER | Age: 54
End: 2019-09-24

## 2019-09-24 DIAGNOSIS — Z12.11 ENCOUNTER FOR FIT (FECAL IMMUNOCHEMICAL TEST) SCREENING: Primary | ICD-10-CM

## 2019-09-26 ENCOUNTER — OFFICE VISIT (OUTPATIENT)
Dept: PODIATRY | Facility: CLINIC | Age: 54
End: 2019-09-26
Payer: COMMERCIAL

## 2019-09-26 VITALS
WEIGHT: 161 LBS | HEART RATE: 84 BPM | DIASTOLIC BLOOD PRESSURE: 74 MMHG | HEIGHT: 60 IN | BODY MASS INDEX: 31.61 KG/M2 | SYSTOLIC BLOOD PRESSURE: 123 MMHG

## 2019-09-26 DIAGNOSIS — M25.572 BILATERAL ANKLE PAIN, UNSPECIFIED CHRONICITY: Primary | ICD-10-CM

## 2019-09-26 DIAGNOSIS — M25.571 BILATERAL ANKLE PAIN, UNSPECIFIED CHRONICITY: Primary | ICD-10-CM

## 2019-09-26 DIAGNOSIS — M25.571 SINUS TARSI SYNDROME, RIGHT: ICD-10-CM

## 2019-09-26 DIAGNOSIS — M62.469 GASTROCNEMIUS EQUINUS, UNSPECIFIED LATERALITY: ICD-10-CM

## 2019-09-26 DIAGNOSIS — M76.71 PERONEAL TENDONITIS, RIGHT: ICD-10-CM

## 2019-09-26 PROCEDURE — 99999 PR PBB SHADOW E&M-EST. PATIENT-LVL III: CPT | Mod: PBBFAC,,, | Performed by: PODIATRIST

## 2019-09-26 PROCEDURE — 3008F PR BODY MASS INDEX (BMI) DOCUMENTED: ICD-10-PCS | Mod: CPTII,S$GLB,, | Performed by: PODIATRIST

## 2019-09-26 PROCEDURE — 99203 PR OFFICE/OUTPT VISIT, NEW, LEVL III, 30-44 MIN: ICD-10-PCS | Mod: S$GLB,,, | Performed by: PODIATRIST

## 2019-09-26 PROCEDURE — 99203 OFFICE O/P NEW LOW 30 MIN: CPT | Mod: S$GLB,,, | Performed by: PODIATRIST

## 2019-09-26 PROCEDURE — 3008F BODY MASS INDEX DOCD: CPT | Mod: CPTII,S$GLB,, | Performed by: PODIATRIST

## 2019-09-26 PROCEDURE — 99999 PR PBB SHADOW E&M-EST. PATIENT-LVL III: ICD-10-PCS | Mod: PBBFAC,,, | Performed by: PODIATRIST

## 2019-09-26 NOTE — PROGRESS NOTES
Subjective:      Patient ID: Randi Burger is a 54 y.o. female.    Chief Complaint: Foot Pain (bilateral and pt feels like her ankle rolls )    complains of worsening pain to bilateral foot ankle right greater than left for the past 6 months since she started a body combat class.  She put a heel lift underneath the left insole and it did improve the pain to left ft significantly however she still has some pain the right.  Relates her pain is not present rest.  Pain is aggravated mainly by increased duration standing walking and high intensity exercise.  She is wearing appears to be neutral type tennis shoe.    Vitals:    09/26/19 0805   BP: 123/74   Pulse: 84   Weight: 73 kg (161 lb)   Height: 5' (1.524 m)   PainSc:   8      History reviewed. No pertinent past medical history.    Past Surgical History:   Procedure Laterality Date    HYSTERECTOMY      @38yrs of age    TONSILLECTOMY         Family History   Problem Relation Age of Onset    Diabetes Maternal Grandmother     Coronary artery disease Father     Heart attack Father     Hypertension Father     Breast cancer Neg Hx     Colon cancer Neg Hx     Ovarian cancer Neg Hx        Social History     Socioeconomic History    Marital status:      Spouse name: Not on file    Number of children: Not on file    Years of education: Not on file    Highest education level: Not on file   Occupational History    Not on file   Social Needs    Financial resource strain: Not on file    Food insecurity:     Worry: Not on file     Inability: Not on file    Transportation needs:     Medical: Not on file     Non-medical: Not on file   Tobacco Use    Smoking status: Never Smoker    Smokeless tobacco: Never Used   Substance and Sexual Activity    Alcohol use: Yes     Comment: socially     Drug use: No    Sexual activity: Yes     Partners: Male     Birth control/protection: Surgical   Lifestyle    Physical activity:     Days per week: Not on file      Minutes per session: Not on file    Stress: Not on file   Relationships    Social connections:     Talks on phone: Not on file     Gets together: Not on file     Attends [employer] service: Not on file     Active member of club or organization: Not on file     Attends meetings of clubs or organizations: Not on file     Relationship status: Not on file   Other Topics Concern    Not on file   Social History Narrative    Not on file       Current Outpatient Medications   Medication Sig Dispense Refill    calcium citrate-vitamin D3 315-200 mg (CITRACAL+D) 315-200 mg-unit per tablet Take 1 tablet by mouth once daily.      estradiol (ESTRACE) 0.01 % (0.1 mg/gram) vaginal cream Place 1 gram vaginally twice a week. 42.5 g 6    meloxicam (MOBIC) 7.5 MG tablet Take 1 tablet (7.5 mg total) by mouth once daily. 90 tablet 3     No current facility-administered medications for this visit.        Review of patient's allergies indicates:  No Known Allergies        Review of Systems   Constitution: Negative for chills, fever and malaise/fatigue.   HENT: Negative for congestion and hearing loss.    Cardiovascular: Negative for chest pain, claudication and leg swelling.   Respiratory: Negative for cough and shortness of breath.    Skin: Negative for color change, itching, poor wound healing and rash.   Musculoskeletal: Positive for back pain and joint pain. Negative for muscle cramps and muscle weakness.   Gastrointestinal: Negative for nausea and vomiting.   Neurological: Negative for numbness, paresthesias and weakness.   Psychiatric/Behavioral: Negative for altered mental status.           Objective:      Physical Exam   Constitutional: She is oriented to person, place, and time. She appears well-developed and well-nourished. No distress.   Cardiovascular: Intact distal pulses.   Pulses:       Dorsalis pedis pulses are 2+ on the right side, and 2+ on the left side.        Posterior tibial pulses are 2+ on the right side, and  2+ on the left side.   Musculoskeletal:   Supinated foot type bilateral.    + equinus that reduces with knee bent bilateral.    Mild localized pain over the sinus tarsi right foot. Mild localized pain commencing posterior to the lateral malleolus extending along the peroneal tendons to approximately the peroneal tubercle region right lateral ankle/hindfoot.  Slight pain with active resisted eversion right foot.  Negative anterior drawer sign bilateral ankle.    Subtalar joint range of motion normal bilateral foot.     Neurological: She is alert and oriented to person, place, and time. She has normal strength. No sensory deficit.   Skin: Skin is warm, dry and intact. Capillary refill takes less than 2 seconds. No ecchymosis and no rash noted. She is not diaphoretic. No cyanosis or erythema. No pallor. Nails show no clubbing.             Assessment:       Encounter Diagnoses   Name Primary?    Bilateral ankle pain, unspecified chronicity Yes    Sinus tarsi syndrome, right     Peroneal tendonitis, right     Gastrocnemius equinus, unspecified laterality          Plan:       Randi was seen today for foot pain.    Diagnoses and all orders for this visit:    Bilateral ankle pain, unspecified chronicity  -     ORTHOTIC DEVICE (DME)  -     Ambulatory Referral to Physical/Occupational Therapy    Sinus tarsi syndrome, right  -     ORTHOTIC DEVICE (DME)  -     Ambulatory Referral to Physical/Occupational Therapy    Peroneal tendonitis, right  -     ORTHOTIC DEVICE (DME)  -     Ambulatory Referral to Physical/Occupational Therapy    Gastrocnemius equinus, unspecified laterality  -     ORTHOTIC DEVICE (DME)  -     Ambulatory Referral to Physical/Occupational Therapy      I counseled the patient on her conditions, their implications and medical management.    Dispensed literature on and demonstrated calf muscle stretches. Reviewed appropriate shoe gear and discussed activity modification such as avoiding flat shoes and  "barefoot walking. Recommend 1/2-1" heel height.    Prescription for custom-molded orthotics molded to neutral.  Info dispensed on local DME providers.    Inspected her tennis shoes and noted to be adequate with good amount of cushion support.    Referred to physical therapy for lower extremity gradual strengthening as well as range of motion and joint mobilization.  Consider aggressive treatment on gastrocsoleus complex to increased ankle range of motion. Iontophoresis over the involved peroneal tendons and sinus tarsi region right hindfoot.    Consider reducing after the by 50% a performing only low-impact exercises.    RTC 6-8 weeks or p.r.n. as discussed.  Rest ice and elevate p.r.n..  "

## 2019-10-02 ENCOUNTER — CLINICAL SUPPORT (OUTPATIENT)
Dept: REHABILITATION | Facility: HOSPITAL | Age: 54
End: 2019-10-02
Attending: PODIATRIST
Payer: COMMERCIAL

## 2019-10-02 DIAGNOSIS — M79.672 FOOT PAIN, BILATERAL: ICD-10-CM

## 2019-10-02 DIAGNOSIS — R26.89 GAIT, ANTALGIC: ICD-10-CM

## 2019-10-02 DIAGNOSIS — R26.89 POOR BALANCE: ICD-10-CM

## 2019-10-02 DIAGNOSIS — R29.898 WEAKNESS OF BOTH LOWER EXTREMITIES: ICD-10-CM

## 2019-10-02 DIAGNOSIS — M79.671 FOOT PAIN, BILATERAL: ICD-10-CM

## 2019-10-02 PROCEDURE — 97162 PT EVAL MOD COMPLEX 30 MIN: CPT | Mod: PO

## 2019-10-02 PROCEDURE — 97110 THERAPEUTIC EXERCISES: CPT | Mod: PO

## 2019-10-02 NOTE — PATIENT INSTRUCTIONS
Dorsiflexion: Resisted        Facing anchor, tubing around left foot, pull toward face.   Repeat 10 times per set. Do 3 sets per session. Do 2 sessions per day.     [email or web address]     Copyright © [employer]. All rights reserved.   Plantar Flexion: Resisted        Wells behind, tubing around left foot, press down.  Repeat 10 times per set. Do 3 sets per session. Do 2 sessions per day.      [email or web address]     Copyright © [employer]. All rights reserved.   Inversion: Resisted        Cross legs with right leg underneath, foot in tubing loop. Hold tubing around other foot to resist and turn foot in.  Repeat 10 times per set. Do 3 sets per session. Do 2 sessions per day.     [email or web address]     Copyright © [employer]. All rights reserved.   Eversion: Resisted        With right foot in tubing loop, hold tubing around other foot to resist and turn foot out.  Repeat 10 times per set. Do 3 sets per session. Do 2 sessions per day.     [email or web address]     Copyright © [employer]. All rights reserved.         Hamstring Curl: Resisted (Sitting)        Facing anchor with tubing on right ankle, leg straight out, bend knee.  Repeat 10 times per set. Do 1 sets per session. Do 2 sessions per day.      [email or web address]      Stretching: Calf - Towel        Sit with knee straight and towel looped around left foot. Gently pull on towel until stretch is felt in calf. Hold 10 seconds.  Repeat 10 times per set. Do 1 sets per session. Do 2 sessions per day.     [email or web address]     Copyright © [employer]. All rights reserved.

## 2019-10-02 NOTE — PLAN OF CARE
[facility]  Physical Therapy Initial Evaluation    Name: Randi Burger  Clinic Number: [id number]    Therapy Diagnosis:   Encounter Diagnoses   Name Primary?    Foot pain, bilateral     Poor balance     Gait, antalgic     Weakness of both lower extremities      Physician: Jason Bhakta DPM    Physician Orders: PT Eval and Treat: Consider ASTYM  Medical Diagnosis from Referral:   M25.571,M25.572 (ICD-10-CM) - Bilateral ankle pain, unspecified chronicity   M25.571 (ICD-10-CM) - Sinus tarsi syndrome, right   M76.71 (ICD-10-CM) - Peroneal tendonitis, right   M21.6X9 (ICD-10-CM) - Gastrocnemius equinus, unspecified laterality   Evaluation Date: 10/2/2019  Authorization Period Expiration: 12/31/2019  Plan of Care Expiration: 12/02/19  Visit # / Visits authorized: 1/ 15    Time In: 3:00 pm   Time Out: 4:00 pm  Total Billable Time: 60 minutes    Precautions: Standard    Subjective   Date of onset: chronic with a recent flare up in May 2019   History of current condition - Randi reports: she has been dealing with foot pain for years, however she most recently has a flare up in May after beginning exercise classes. Patient states she is aware that she walks with her feet in excessive supination which causes her pain, however since joining a new exercise class her pain has increased. Patient states she wears a certain pair of tennis shoes that help with pain relief, however she is unable to wear heels or other shoes due to sharp increases in pain. Patient denies using any ice or heat for pain management. Patient denies taking any current pain medication at this time. Patient states she experiences intermittent numbness and tingling of the legs and feet. Patient has difficulty ambulating/standing for prolonged periods of time, performing recreational activities, and daily household ADLs.      Medical History:   No past medical history on file.    Surgical History:   Randi Burger  has a  past surgical history that includes Tonsillectomy and Hysterectomy.    Medications:   Randi has a current medication list which includes the following prescription(s): calcium citrate-vitamin d3 315-200 mg, estradiol, and meloxicam.    Allergies:   Review of patient's allergies indicates:  No Known Allergies     Imaging, none:     Prior Therapy: yes not for current condition   Social History: one-story home lives with their spouse  Occupation: cardiology technician   Prior Level of Function: independent   Current Level of Function: independent     Pain:  Current 7/10, worst 8/10, best 0/10   Location: bilateral feet   Description: Aching and Deep  Aggravating Factors: Standing and Walking  Easing Factors: rest and sitting     Pts goals: get rid of pain and add an insert to her shoes to get her out of supination     Objective     Observation: pes cavus (B) feet with increased supination during ambulation; (B) pockets of of fluid observed at (B) lateral anterior malleoli     Posture: unremarkable     Gait: increase supination throughout gait cycle    Range of Motion: AROM:    Ankle Left Right   Dorsiflexion 20  45    Plantarflexion 40  12    Inversion 15 25    Eversion 10  15      Strength:    Knee Left Right   Extension 4+/5 4+/5   Flexion 4-/5 4-/5     Ankle Left Right   Dorsiflexion 4+/5 5/5   Plantarflexion 5/5 5/5   Inversion 4/5 4/5   Eversion 4+/5 4+/5       Joint Mobility: Talocural normal in posterior and anterior direction; calcaneal mobilizations G2 slight restriction in the medial direction     Palpation: mild tenderness at the distal R fibularis brevis and longus     Girth:   - R figure 8: 48 cm  - L figure 8: 47 cm     Sensation: intact     SLS Stance :   -R: 5 seconds; L: 1 seconds    CMS Impairment/Limitation/Restriction for FOTO Foot Survey    Therapist reviewed FOTO scores for Randi Burger on 10/2/2019.   FOTO documents entered into [employer] - see Media section.    Limitation Score: 30%  Category:  Mobility    Current : CJ = at least 20% but < 40% impaired, limited or restricted  Goal: CJ = at least 20% but < 40% impaired, limited or restricted         TREATMENT   Treatment Time In: 3:00 pm  Treatment Time Out: 4:00 pm  Total Treatment time separate from Evaluation: 10 minutes    Randi received therapeutic exercises to develop strength, endurance, ROM, flexibility and core stabilization for 10 minutes including: HEP Review and Development     Date  10/2/19   VISIT 1/15   FOTO  1/5   POC EXP. DATE 12/2/19   FACE-TO-FACE 11/2/19        TABLE:     HSS + Gastroc  w/ red strap --   SAQ --   Ankle 4 way   - DF  - PF  - EV     SLR      SL Hip ABD --         SEATED:     Baps L1   - CC/CW   - DF/PF   - IN --   HS curls --   [place]             STANDING:     Heel Raises      SL Balance Foam      Tandem Balance Foam     Initials BJ         Randi received the following manual therapy techniques: Joint mobilizations, Manual traction and Soft tissue Mobilization were applied to the: (B) fibularis longus and brevis muscles  for 10 minutes, including: NOT TODAY IASTYM using Hawks 's tools     Home Exercises and Patient Education Provided    Education provided:   - perform HEP in pain-free range   - proper tennis shoes     Written Home Exercises Provided: yes.  Exercises were reviewed and Randi was able to demonstrate them prior to the end of the session.  Randi demonstrated good  understanding of the education provided.     See EMR under Patient Instructions for exercises provided 10/2/2019.    Assessment   Randi is a 54 y.o. female referred to outpatient Physical Therapy with a medical diagnosis of Bilateral ankle pain, unspecified chronicity. Pt presents with decrease ROM, flexibility, strength, and overall functional mobility. Patient demonstrated mild strength deficits of (B) ankles with no pain with MMT. Tenderness was noted of the R fibularis longus and brevis muscles, along with (B) pockets of fluid at the  anterior lateral malleoli due to increased inflammation. During gait obeservation, it was evident that patient demonstrated excessive supination, along with observation of pes cavus bilaterally, contributing to increase inflammation and possible tendonitis. Patient also demonstrated increase pain with AROM Eversion. Talocural joint mobility was normal, however calcaneal mobility were slightly hypomobile in the medial direction. Patient may benefit from supination prevention inserts to help with proper foot alignment. Patient is currently CJ = at least 20% but < 40% impaired, limited or restricted.    Pt prognosis is Good.   Pt will benefit from skilled outpatient Physical Therapy to address the deficits stated above and in the chart below, provide pt/family education, and to maximize pt's level of independence.     Plan of care discussed with patient: Yes  Pt's spiritual, cultural and educational needs considered and patient is agreeable to the plan of care and goals as stated below:     Anticipated Barriers for therapy: none     Medical Necessity is demonstrated by the following  History  Co-morbidities and personal factors that may impact the plan of care Co-morbidities:   back pain    Personal Factors:   no deficits     moderate   Examination  Body Structures and Functions, activity limitations and participation restrictions that may impact the plan of care Body Regions:   lower extremities    Body Systems:    ROM  strength  balance  gait  edema    Participation Restrictions:   none    Activity limitations:   Learning and applying knowledge  no deficits    General Tasks and Commands  no deficits    Communication  no deficits    Mobility  walking    Self care  recreational actvities     Domestic Life  shopping  cooking  doing house work (cleaning house, washing dishes, laundry)    Interactions/Relationships  no deficits    Life Areas  no deficits    Community and Social Life  community life  recreation and  leisure         moderate   Clinical Presentation evolving clinical presentation with changing clinical characteristics moderate   Decision Making/ Complexity Score: moderate     Goals:  Short Term Goals: 4 weeks   Patient will be able to perform SL balance for 10 seconds to reduce risk of falls   Reduce pain and tenderness of (B) fibularis muscles in order to reduce swelling at ankle joints.   Reduce (B) ankle swelling by 3 cm to reduce pain and inflammation.     Long Term Goals: 8 weeks   Patient will be able to ambulate for 1 hour consecutively with min-no pain/difficulty in order to perform grocery shopping.  Patient will be able to stand for 1 hour consecutively with min-no pain/difficulty in order to cook one full meal.   Patient will be able to return to recreational activities such as group exercise class with min-no pain/difficulty with modifications.     Plan   Plan of care Certification: 10/2/2019 to 12/2/19.    Outpatient Physical Therapy 2 times weekly for 8 weeks to include the following interventions: Electrical Stimulation NMES, Manual Therapy, Neuromuscular Re-ed, Patient Education and Therapeutic Exercise.     Betty Phelan, PT, DPT

## 2019-10-03 PROBLEM — R29.898 WEAKNESS OF BOTH LOWER EXTREMITIES: Status: ACTIVE | Noted: 2019-10-03

## 2019-10-03 PROBLEM — R26.89 POOR BALANCE: Status: ACTIVE | Noted: 2019-10-03

## 2019-10-03 PROBLEM — R26.89 GAIT, ANTALGIC: Status: ACTIVE | Noted: 2019-10-03

## 2019-10-03 PROBLEM — M79.671 FOOT PAIN, BILATERAL: Status: ACTIVE | Noted: 2019-10-03

## 2019-10-03 PROBLEM — M79.672 FOOT PAIN, BILATERAL: Status: ACTIVE | Noted: 2019-10-03

## 2019-10-04 ENCOUNTER — CLINICAL SUPPORT (OUTPATIENT)
Dept: REHABILITATION | Facility: HOSPITAL | Age: 54
End: 2019-10-04
Attending: PODIATRIST
Payer: COMMERCIAL

## 2019-10-04 DIAGNOSIS — M79.672 FOOT PAIN, BILATERAL: ICD-10-CM

## 2019-10-04 DIAGNOSIS — R26.89 POOR BALANCE: ICD-10-CM

## 2019-10-04 DIAGNOSIS — M79.671 FOOT PAIN, BILATERAL: ICD-10-CM

## 2019-10-04 DIAGNOSIS — R26.89 GAIT, ANTALGIC: ICD-10-CM

## 2019-10-04 DIAGNOSIS — R29.898 WEAKNESS OF BOTH LOWER EXTREMITIES: ICD-10-CM

## 2019-10-04 PROCEDURE — 97110 THERAPEUTIC EXERCISES: CPT | Mod: PO

## 2019-10-04 PROCEDURE — 97140 MANUAL THERAPY 1/> REGIONS: CPT | Mod: PO

## 2019-10-04 NOTE — PROGRESS NOTES
"  Physical Therapy Daily Treatment Note     Name: Randi Burger  Clinic Number: [id number]    Therapy Diagnosis:   Encounter Diagnoses   Name Primary?    Foot pain, bilateral     Poor balance     Gait, antalgic     Weakness of both lower extremities      Physician: Jason Bhakta DPM    Visit Date: 10/4/2019    Physician Orders: PT Eval and Treat: Consider ASTYM  Medical Diagnosis from Referral:   M25.571,M25.572 (ICD-10-CM) - Bilateral ankle pain, unspecified chronicity   M25.571 (ICD-10-CM) - Sinus tarsi syndrome, right   M76.71 (ICD-10-CM) - Peroneal tendonitis, right   M21.6X9 (ICD-10-CM) - Gastrocnemius equinus, unspecified laterality   Evaluation Date: 10/2/2019  Authorization Period Expiration: 12/31/2019  Plan of Care Expiration: 12/02/19  Visit # / Visits authorized: 2/ 15    Time In: 8:00 am   Time Out: 8:55 am  Total Billable Time: 55 minutes    Precautions: Standard    Subjective     Pt reports: she is not having a lot of pain this morning, however pain levels are usually low in the morning.  She was compliant with home exercise program.  Response to previous treatment: N/A  Functional change: N/A    Pain: 2/10  Location: bilateral feet      Objective     Randi received therapeutic exercises to develop strength, endurance, ROM, flexibility and core stabilization for 40 minutes including:      Date  10/4/19   VISIT 2/15   FOTO  2/5   POC EXP. DATE 12/2/19   FACE-TO-FACE 11/2/19         TABLE:     HSS + Gastroc  w/ red strap 10 x 10"   SAQ    Ankle 4 way   - DF  - PF  - EV RTB  2 x 10   2 x 10   2 x 10     SLR   1 x 15   SL Hip ABD Next          SEATED:     Baps L1   - CC/CW   - DF/PF   - EV   Next  1 x 10   1 x 10   HS curls 1 x 15 RTB   [place]    Next          STANDING:     Heel Raises   --   SL Balance Foam   2 x 20" ea    Tandem Balance Foam --    Initials BJ         Randi received the following manual therapy techniques: Joint mobilizations, Manual traction and Soft tissue Mobilization " were applied to the: (B) fibularis longus and brevis muscles  for 15 minutes, including: IASTYM using Hawks 's tools     Home Exercises Provided and Patient Education Provided     Education provided:   - exercise modification according to pain levels      Written Home Exercises Provided: yes.  Exercises were reviewed and Randi was able to demonstrate them prior to the end of the session.  Randi demonstrated good  understanding of the education provided.      See EMR under Patient Instructions for exercises provided 10/2/2019.    Assessment     Randi completed the above exercise with verbal and tactile cueing to perform with proper form and correct technique. Patient has no difficulty with today's new exercises. Patient did demonstrate some LOB with SL stance but was able to complete all repetitions. Patient demonstrated good tolerance to manual therapy despite experiencing some mild tenderness of the fibularis longus and brevis distal attachment sites.     Randi is progressing well towards her goals.   Pt prognosis is Good.     Pt will continue to benefit from skilled outpatient physical therapy to address the deficits listed in the problem list box on initial evaluation, provide pt/family education and to maximize pt's level of independence in the home and community environment.     Pt's spiritual, cultural and educational needs considered and pt agreeable to plan of care and goals.     Anticipated barriers to physical therapy: none    Goals:   Short Term Goals: 4 weeks   Patient will be able to perform SL balance for 10 seconds to reduce risk of falls. IN PROGRESS  Reduce pain and tenderness of (B) fibularis muscles in order to reduce swelling at ankle joints. IN PROGRESS  Reduce (B) ankle swelling by 3 cm to reduce pain and inflammation. IN PROGRESS     Long Term Goals: 8 weeks   Patient will be able to ambulate for 1 hour consecutively with min-no pain/difficulty in order to perform grocery shopping. IN  PROGRESS  Patient will be able to stand for 1 hour consecutively with min-no pain/difficulty in order to cook one full meal. IN PROGRESS  Patient will be able to return to recreational activities such as group exercise class with min-no pain/difficulty with modifications. IN PROGRESS    Plan     Continue with current POC.     Betty Phelan, PT, DPT

## 2019-10-07 ENCOUNTER — CLINICAL SUPPORT (OUTPATIENT)
Dept: REHABILITATION | Facility: HOSPITAL | Age: 54
End: 2019-10-07
Attending: PODIATRIST
Payer: COMMERCIAL

## 2019-10-07 DIAGNOSIS — R26.89 POOR BALANCE: ICD-10-CM

## 2019-10-07 DIAGNOSIS — R26.89 GAIT, ANTALGIC: ICD-10-CM

## 2019-10-07 DIAGNOSIS — R29.898 WEAKNESS OF BOTH LOWER EXTREMITIES: ICD-10-CM

## 2019-10-07 DIAGNOSIS — M79.672 FOOT PAIN, BILATERAL: ICD-10-CM

## 2019-10-07 DIAGNOSIS — M79.671 FOOT PAIN, BILATERAL: ICD-10-CM

## 2019-10-07 PROCEDURE — 97110 THERAPEUTIC EXERCISES: CPT | Mod: PO

## 2019-10-07 PROCEDURE — 97140 MANUAL THERAPY 1/> REGIONS: CPT | Mod: PO

## 2019-10-07 NOTE — PROGRESS NOTES
"  Physical Therapy Daily Treatment Note     Name: Randi Burger  Clinic Number: [id number]    Therapy Diagnosis:   Encounter Diagnoses   Name Primary?    Foot pain, bilateral     Poor balance     Gait, antalgic     Weakness of both lower extremities      Physician: Jason Bhakta DPM    Visit Date: 10/7/2019    Physician Orders: PT Eval and Treat: Consider ASTYM  Medical Diagnosis from Referral:   M25.571,M25.572 (ICD-10-CM) - Bilateral ankle pain, unspecified chronicity   M25.571 (ICD-10-CM) - Sinus tarsi syndrome, right   M76.71 (ICD-10-CM) - Peroneal tendonitis, right   M21.6X9 (ICD-10-CM) - Gastrocnemius equinus, unspecified laterality   Evaluation Date: 10/2/2019  Authorization Period Expiration: 12/31/2019  Plan of Care Expiration: 12/02/19  Visit # / Visits authorized: 3/ 15    Time In: 2:45 pm   Time Out: 3:45 pm  Total Billable Time:60 minutes    Precautions: Standard    Subjective     Pt reports: she felt as if she amy her ankle yesterday while cooking and could not put any weight on it, however woke up with less pain.  She was compliant with home exercise program.  Response to previous treatment:   Functional change: N/A    Pain: 2/10  Location: bilateral feet      Objective     Randi received therapeutic exercises to develop strength, endurance, ROM, flexibility and core stabilization for 45 minutes including:      Date  10/7/19 10/4/19   VISIT 3/15 2/15   FOTO  3/5 2/5   POC EXP. DATE 12/2/19 12/2/19   FACE-TO-FACE 11/2/19 11/2/19          TABLE:      HSS + Gastroc  w/ red strap 10 x 10" 10 x 10"   SAQ     Ankle 4 way   - DF  - PF  - EV RTB  2 x 10   2 x 10   2 x 10  RTB  2 x 10   2 x 10    2 x 10     SLR  2 x 10   1 x 15   SL Hip ABD 1 x 15 Next           SEATED:      Baps L1   - CC/CW   - DF/PF   - EV   1 x 10   1 x 15  1 x 15    Next  1 x 10   1 x 10   HS curls 2 x 10 RTB 1 x 15 RTB   [place]   Next  Next           STANDING:      Heel Raises  1 x 15   --   SL Balance Foam  2 x 20" " "ea   2 x 20" ea    Tandem Balance Foam 2 x 20" ea --    Initials BJ BJ         Randi received the following manual therapy techniques: Joint mobilizations, Manual traction and Soft tissue Mobilization were applied to the: (B) fibularis longus and brevis muscles  for 15 minutes, including: IASTYM using Hawks 's tools     Home Exercises Provided and Patient Education Provided     Education provided:   - practicing balance daily      Written Home Exercises Provided: yes.  Exercises were reviewed and Randi was able to demonstrate them prior to the end of the session.  Randi demonstrated good  understanding of the education provided.      See EMR under Patient Instructions for exercises provided 10/2/2019.    Assessment     Randi completed the above exercise with verbal and tactile cueing to perform with proper form and correct technique. Patient has no difficulty with today's new exercises. Patient demonstrated good tolerance to manual therapy however did experiencing some increase tenderness of the fibularis longus and brevis distal attachment sites today. Patient performed more dynamic standing activities today with no difficulty.     Randi is progressing well towards her goals.   Pt prognosis is Good.     Pt will continue to benefit from skilled outpatient physical therapy to address the deficits listed in the problem list box on initial evaluation, provide pt/family education and to maximize pt's level of independence in the home and community environment.     Pt's spiritual, cultural and educational needs considered and pt agreeable to plan of care and goals.     Anticipated barriers to physical therapy: none    Goals:   Short Term Goals: 4 weeks   Patient will be able to perform SL balance for 10 seconds to reduce risk of falls. IN PROGRESS  Reduce pain and tenderness of (B) fibularis muscles in order to reduce swelling at ankle joints. IN PROGRESS  Reduce (B) ankle swelling by 3 cm to reduce pain and " inflammation. IN PROGRESS     Long Term Goals: 8 weeks   Patient will be able to ambulate for 1 hour consecutively with min-no pain/difficulty in order to perform grocery shopping. IN PROGRESS  Patient will be able to stand for 1 hour consecutively with min-no pain/difficulty in order to cook one full meal. IN PROGRESS  Patient will be able to return to recreational activities such as group exercise class with min-no pain/difficulty with modifications. IN PROGRESS    Plan     Continue with current POC. Perform marble  next visit.     Betty Phelan, PT, DPT

## 2019-10-10 ENCOUNTER — CLINICAL SUPPORT (OUTPATIENT)
Dept: REHABILITATION | Facility: HOSPITAL | Age: 54
End: 2019-10-10
Attending: PODIATRIST
Payer: COMMERCIAL

## 2019-10-10 DIAGNOSIS — R29.898 WEAKNESS OF BOTH LOWER EXTREMITIES: ICD-10-CM

## 2019-10-10 DIAGNOSIS — M79.672 FOOT PAIN, BILATERAL: ICD-10-CM

## 2019-10-10 DIAGNOSIS — R26.89 POOR BALANCE: ICD-10-CM

## 2019-10-10 DIAGNOSIS — M79.671 FOOT PAIN, BILATERAL: ICD-10-CM

## 2019-10-10 DIAGNOSIS — R26.89 GAIT, ANTALGIC: ICD-10-CM

## 2019-10-10 PROCEDURE — 97110 THERAPEUTIC EXERCISES: CPT | Mod: PO

## 2019-10-10 NOTE — PROGRESS NOTES
"  Physical Therapy Daily Treatment Note     Name: Randi Burger  Clinic Number: [id number]    Therapy Diagnosis:   Encounter Diagnoses   Name Primary?    Foot pain, bilateral     Poor balance     Gait, antalgic     Weakness of both lower extremities      Physician: Jason Bhakta DPM    Visit Date: 10/10/2019    Physician Orders: PT Eval and Treat: Consider ASTYM  Medical Diagnosis from Referral:   M25.571,M25.572 (ICD-10-CM) - Bilateral ankle pain, unspecified chronicity   M25.571 (ICD-10-CM) - Sinus tarsi syndrome, right   M76.71 (ICD-10-CM) - Peroneal tendonitis, right   M21.6X9 (ICD-10-CM) - Gastrocnemius equinus, unspecified laterality   Evaluation Date: 10/2/2019  Authorization Period Expiration: 12/31/2019  Plan of Care Expiration: 12/02/19  Visit # / Visits authorized: 4/ 15    Time In: 4:00 pm   Time Out: 4:58 pm  Total Billable Time: 30 minutes    Precautions: Standard    Subjective     Pt reports: her pain level is very low right now but earlier today she was doing some exercises and experienced a lot of pain in both of her ankles.  She was working out with   She was compliant with home exercise program.  Response to previous treatment:   Functional change: N/A    Pain: 2/10  Location: bilateral feet      Objective     Randi received therapeutic exercises to develop strength, endurance, ROM, flexibility and core stabilization for 30 minutes including:      Date  10/110/19 10/7/19 10/4/19   VISIT 4/15 3/15 2/15   FOTO  4/5 3/5 2/5   POC EXP. DATE 12/02/19 12/2/19 12/2/19   FACE-TO-FACE 11/02/19 11/2/19 11/2/19           TABLE:       HSS + Gastroc  w/ red strap 10 x 10" 10 x 10" 10 x 10"   SAQ ---     Ankle 4 way   - DF  - PF  - EV RTB  3 x 10  3 x 10  3 x 10 RTB  2 x 10   2 x 10   2 x 10  RTB  2 x 10   2 x 10    2 x 10     SLR  2 x 10 2 x 10   1 x 15   SL Hip ABD 2 x 10 1 x 15 Next            SEATED:       Baps L1   - CC/CW   - DF/PF   - EV   1 x 10   1 x 15  1 x 15   1 x 10   1 x 15  1 x 15  " "  Next  1 x 10   1 x 10   HS curls 3 x 10 RTB 2 x 10 RTB 1 x 15 RTB   [place]   1 cup Next  Next            STANDING:       Heel Raises  Not today 1 x 15   --   SL Balance Foam  Not today 2 x 20" ea   2 x 20" ea    Tandem Balance Foam Not today 2 x 20" ea --          Initials GWA 1/6 BJ BJ         Randi received the following manual therapy techniques: Joint mobilizations, Manual traction and Soft tissue Mobilization were applied to the: (B) fibularis longus and brevis muscles  for 15 minutes, including: NOT TODAY IASTYM using Hawks 's tools     Home Exercises Provided and Patient Education Provided     Education provided:   - practicing balance daily      Written Home Exercises Provided: yes.  Exercises were reviewed and Randi was able to demonstrate them prior to the end of the session.  Randi demonstrated good  understanding of the education provided.      See EMR under Patient Instructions for exercises provided 10/2/2019.    Assessment     Randi completed the above exercise with verbal and tactile cueing to perform with proper form and correct technique and had no difficulty with new exercise added along with today's progressions. Patient did not complete her standing exercises due to time needed for today's progressions.     Randi is progressing well towards her goals.   Pt prognosis is Good.     Pt will continue to benefit from skilled outpatient physical therapy to address the deficits listed in the problem list box on initial evaluation, provide pt/family education and to maximize pt's level of independence in the home and community environment.     Pt's spiritual, cultural and educational needs considered and pt agreeable to plan of care and goals.     Anticipated barriers to physical therapy: none    Goals:   Short Term Goals: 4 weeks   Patient will be able to perform SL balance for 10 seconds to reduce risk of falls. IN PROGRESS  Reduce pain and tenderness of (B) fibularis muscles in order to " reduce swelling at ankle joints. IN PROGRESS  Reduce (B) ankle swelling by 3 cm to reduce pain and inflammation. IN PROGRESS     Long Term Goals: 8 weeks   Patient will be able to ambulate for 1 hour consecutively with min-no pain/difficulty in order to perform grocery shopping. IN PROGRESS  Patient will be able to stand for 1 hour consecutively with min-no pain/difficulty in order to cook one full meal. IN PROGRESS  Patient will be able to return to recreational activities such as group exercise class with min-no pain/difficulty with modifications. IN PROGRESS    Plan     Continue with current POC.  Resume standing exercises next visit.     Sammy Ambriz, PTA

## 2019-11-27 ENCOUNTER — PATIENT OUTREACH (OUTPATIENT)
Dept: ADMINISTRATIVE | Facility: OTHER | Age: 54
End: 2019-11-27

## 2019-12-02 ENCOUNTER — OFFICE VISIT (OUTPATIENT)
Dept: OPTOMETRY | Facility: CLINIC | Age: 54
End: 2019-12-02
Payer: COMMERCIAL

## 2019-12-02 DIAGNOSIS — H52.4 MYOPIA OF BOTH EYES WITH ASTIGMATISM AND PRESBYOPIA: Primary | ICD-10-CM

## 2019-12-02 DIAGNOSIS — H52.13 MYOPIA OF BOTH EYES WITH ASTIGMATISM AND PRESBYOPIA: Primary | ICD-10-CM

## 2019-12-02 DIAGNOSIS — H52.203 MYOPIA OF BOTH EYES WITH ASTIGMATISM AND PRESBYOPIA: Primary | ICD-10-CM

## 2019-12-02 PROCEDURE — 99999 PR PBB SHADOW E&M-EST. PATIENT-LVL II: CPT | Mod: PBBFAC,,, | Performed by: OPTOMETRIST

## 2019-12-02 PROCEDURE — 92004 COMPRE OPH EXAM NEW PT 1/>: CPT | Mod: S$GLB,,, | Performed by: OPTOMETRIST

## 2019-12-02 PROCEDURE — 92015 DETERMINE REFRACTIVE STATE: CPT | Mod: S$GLB,,, | Performed by: OPTOMETRIST

## 2019-12-02 PROCEDURE — 92015 PR REFRACTION: ICD-10-PCS | Mod: S$GLB,,, | Performed by: OPTOMETRIST

## 2019-12-02 PROCEDURE — 92004 PR EYE EXAM, NEW PATIENT,COMPREHESV: ICD-10-PCS | Mod: S$GLB,,, | Performed by: OPTOMETRIST

## 2019-12-02 PROCEDURE — 99999 PR PBB SHADOW E&M-EST. PATIENT-LVL II: ICD-10-PCS | Mod: PBBFAC,,, | Performed by: OPTOMETRIST

## 2019-12-02 NOTE — PROGRESS NOTES
HPI     TAYLER about 1 yr. Ago elsewhere.  Glasses about 1 yr old but patient is   wearing an older pair from 6 yrs ago because she like the frame better.    Glasses are distance only and patient takes off to read.   Uses AT's prn   and Jj at night when needed. Denies any flashes or floaters.    Last edited by Tanisha Victor, OD on 12/2/2019 11:54 AM. (History)            Assessment /Plan     For exam results, see Encounter Report.    Myopia of both eyes with astigmatism and presbyopia      1. Updated SRx. Rx for distance only per patient's preference. Patient takes off glasses for near. Educated patient on changes. RTC in 1 yr for annual eye exam or prn.

## 2020-03-04 ENCOUNTER — TELEPHONE (OUTPATIENT)
Dept: OBSTETRICS AND GYNECOLOGY | Facility: CLINIC | Age: 55
End: 2020-03-04

## 2020-03-04 DIAGNOSIS — Z12.31 ENCOUNTER FOR SCREENING MAMMOGRAM FOR BREAST CANCER: Primary | ICD-10-CM

## 2020-04-21 DIAGNOSIS — Z01.84 ANTIBODY RESPONSE EXAMINATION: ICD-10-CM

## 2020-04-22 ENCOUNTER — LAB VISIT (OUTPATIENT)
Dept: LAB | Facility: HOSPITAL | Age: 55
End: 2020-04-22
Attending: INTERNAL MEDICINE
Payer: COMMERCIAL

## 2020-04-22 DIAGNOSIS — Z01.84 ANTIBODY RESPONSE EXAMINATION: ICD-10-CM

## 2020-04-22 LAB — SARS-COV-2 IGG SERPL QL IA: NEGATIVE

## 2020-04-22 PROCEDURE — 36415 COLL VENOUS BLD VENIPUNCTURE: CPT

## 2020-04-22 PROCEDURE — 86769 SARS-COV-2 COVID-19 ANTIBODY: CPT

## 2020-05-12 ENCOUNTER — OFFICE VISIT (OUTPATIENT)
Dept: FAMILY MEDICINE | Facility: CLINIC | Age: 55
End: 2020-05-12
Payer: COMMERCIAL

## 2020-05-12 DIAGNOSIS — N39.0 URINARY TRACT INFECTION WITHOUT HEMATURIA, SITE UNSPECIFIED: Primary | ICD-10-CM

## 2020-05-12 PROCEDURE — 99213 OFFICE O/P EST LOW 20 MIN: CPT | Mod: 95,,, | Performed by: NURSE PRACTITIONER

## 2020-05-12 PROCEDURE — 99213 PR OFFICE/OUTPT VISIT, EST, LEVL III, 20-29 MIN: ICD-10-PCS | Mod: 95,,, | Performed by: NURSE PRACTITIONER

## 2020-05-12 RX ORDER — PHENAZOPYRIDINE HYDROCHLORIDE 200 MG/1
200 TABLET, FILM COATED ORAL 3 TIMES DAILY PRN
Qty: 20 TABLET | Refills: 0 | Status: SHIPPED | OUTPATIENT
Start: 2020-05-12 | End: 2020-05-22

## 2020-05-12 RX ORDER — CIPROFLOXACIN 500 MG/1
500 TABLET ORAL EVERY 12 HOURS
Qty: 14 TABLET | Refills: 0 | Status: SHIPPED | OUTPATIENT
Start: 2020-05-12 | End: 2020-11-12

## 2020-05-12 NOTE — PROGRESS NOTES
Subjective:       Patient ID: Randi Burger is a 54 y.o. female.    Chief Complaint: No chief complaint on file.    The patient location is: [place], [place]  The chief complaint leading to consultation is: UTI  Visit type: audiovisual  Total time spent with patient: 12cminutes  Each patient to whom he or she provides medical services by telemedicine is:  (1) informed of the relationship between the physician and patient and the respective role of any other health care provider with respect to management of the patient; and (2) notified that he or she may decline to receive medical services by telemedicine and may withdraw from such care at any time.    Notes:     UTI symptoms since yesterday, last one was about a year ago. Took AZO. Did a test strp last night, positive for white cells. Symptoms are worsneing.    No past medical history on file.    Past Surgical History:  No date: HYSTERECTOMY      Comment:  @38yrs of age  No date: TONSILLECTOMY    Review of patient's family history indicates:  Problem: Diabetes      Relation: Maternal Grandmother          Age of Onset: (Not Specified)  Problem: Coronary artery disease      Relation: Father          Age of Onset: (Not Specified)  Problem: Heart attack      Relation: Father          Age of Onset: (Not Specified)  Problem: Hypertension      Relation: Father          Age of Onset: (Not Specified)  Problem: Thyroid disease      Relation: Father          Age of Onset: (Not Specified)  Problem: Cancer      Relation: Mother          Age of Onset: (Not Specified)  Problem: Macular degeneration      Relation: Mother          Age of Onset: (Not Specified)  Problem: Breast cancer      Relation: Neg Hx          Age of Onset: (Not Specified)  Problem: Colon cancer      Relation: Neg Hx          Age of Onset: (Not Specified)  Problem: Ovarian cancer      Relation: Neg Hx          Age of Onset: (Not Specified)  Problem: Blindness      Relation: Neg Hx          Age of Onset: (Not  Specified)  Problem: Amblyopia      Relation: Neg Hx          Age of Onset: (Not Specified)  Problem: Cataracts      Relation: Neg Hx          Age of Onset: (Not Specified)  Problem: Glaucoma      Relation: Neg Hx          Age of Onset: (Not Specified)  Problem: Retinal detachment      Relation: Neg Hx          Age of Onset: (Not Specified)  Problem: Strabismus      Relation: Neg Hx          Age of Onset: (Not Specified)  Problem: Stroke      Relation: Neg Hx          Age of Onset: (Not Specified)      Social History    Socioeconomic History      Marital status:       Spouse name: Not on file      Number of children: Not on file      Years of education: Not on file      Highest education level: Not on file    Occupational History      Not on file    Social Needs      Financial resource strain: Not on file      Food insecurity:        Worry: Not on file        Inability: Not on file      Transportation needs:        Medical: Not on file        Non-medical: Not on file    Tobacco Use      Smoking status: Never Smoker      Smokeless tobacco: Never Used    Substance and Sexual Activity      Alcohol use: Yes        Comment: socially       Drug use: No      Sexual activity: Yes        Partners: Male        Birth control/protection: Surgical    Lifestyle      Physical activity:        Days per week: Not on file        Minutes per session: Not on file      Stress: Not on file    Relationships      Social connections:        Talks on phone: Not on file        Gets together: Not on file        Attends Jewish service: Not on file        Active member of club or organization: Not on file        Attends meetings of clubs or organizations: Not on file        Relationship status: Not on file    Other Topics      Concerns:        Not on file    Social History Narrative      Not on file      Current Outpatient Medications:  calcium citrate-vitamin D3 315-200 mg (CITRACAL+D) 315-200 mg-unit per tablet, Take 1 tablet by  mouth once daily., Disp: , Rfl:   ciprofloxacin HCl (CIPRO) 500 MG tablet, Take 1 tablet (500 mg total) by mouth every 12 (twelve) hours., Disp: 14 tablet, Rfl: 0  estradiol (ESTRACE) 0.01 % (0.1 mg/gram) vaginal cream, Place 1 gram vaginally twice a week., Disp: 42.5 g, Rfl: 6  meloxicam (MOBIC) 7.5 MG tablet, Take 1 tablet (7.5 mg total) by mouth once daily., Disp: 90 tablet, Rfl: 3  phenazopyridine (PYRIDIUM) 200 MG tablet, Take 1 tablet (200 mg total) by mouth 3 (three) times daily as needed for Pain., Disp: 20 tablet, Rfl: 0    No current facility-administered medications for this visit.       Review of patient's allergies indicates:  No Known Allergies    Dysuria    The current episode started yesterday. The problem has been rapidly worsening. The pain is at a severity of 8/10. The pain is moderate. There has been no fever. She is sexually active. There is no history of pyelonephritis. Associated symptoms include behavior changes, frequency and urgency. Pertinent negatives include no chills, discharge, flank pain, hematuria, hesitancy, nausea, possible pregnancy, sweats, vomiting, weight loss, constipation, rash or withholding. She has tried home medications and increased fluids for the symptoms. The treatment provided mild relief. Her past medical history is significant for recurrent UTIs. There is no history of catheterization, diabetes insipidus, genitourinary reflux, hypertension, kidney stones, a single kidney, STD, urinary stasis or a urological procedure.     Review of Systems   Constitutional: Negative for chills and weight loss.   Gastrointestinal: Negative for constipation, nausea and vomiting.   Genitourinary: Positive for dysuria, frequency and urgency. Negative for flank pain, hematuria and hesitancy.   Skin: Negative for rash.       Objective:      Physical Exam   Constitutional: She is oriented to person, place, and time.   HENT:   Head: Normocephalic and atraumatic.   Pulmonary/Chest: Effort  normal.   Abdominal: There is tenderness (achiness in lower abdomen).   Neurological: She is alert and oriented to person, place, and time.   Psychiatric: She has a normal mood and affect. Her behavior is normal.       Assessment:       1. Urinary tract infection without hematuria, site unspecified        Plan:       1. Urinary tract infection without hematuria, site unspecified  Increase fluids, avoid caffeine, follow up if not resolving.   - ciprofloxacin HCl (CIPRO) 500 MG tablet; Take 1 tablet (500 mg total) by mouth every 12 (twelve) hours.  Dispense: 14 tablet; Refill: 0  - phenazopyridine (PYRIDIUM) 200 MG tablet; Take 1 tablet (200 mg total) by mouth 3 (three) times daily as needed for Pain.  Dispense: 20 tablet; Refill: 0

## 2020-05-27 ENCOUNTER — HOSPITAL ENCOUNTER (OUTPATIENT)
Dept: RADIOLOGY | Facility: HOSPITAL | Age: 55
Discharge: HOME | End: 2020-05-27
Attending: OBSTETRICS & GYNECOLOGY
Payer: COMMERCIAL

## 2020-05-27 DIAGNOSIS — Z12.31 ENCOUNTER FOR SCREENING MAMMOGRAM FOR BREAST CANCER: ICD-10-CM

## 2020-05-27 PROCEDURE — 77063 BREAST TOMOSYNTHESIS BI: CPT | Mod: 26,,, | Performed by: RADIOLOGY

## 2020-05-27 PROCEDURE — 77067 MAMMO DIGITAL SCREENING BILAT WITH TOMOSYNTHESIS_CAD: ICD-10-PCS | Mod: 26,,, | Performed by: RADIOLOGY

## 2020-05-27 PROCEDURE — 77063 MAMMO DIGITAL SCREENING BILAT WITH TOMOSYNTHESIS_CAD: ICD-10-PCS | Mod: 26,,, | Performed by: RADIOLOGY

## 2020-05-27 PROCEDURE — 77067 SCR MAMMO BI INCL CAD: CPT | Mod: 26,,, | Performed by: RADIOLOGY

## 2020-05-27 PROCEDURE — 77067 SCR MAMMO BI INCL CAD: CPT | Mod: TC

## 2020-06-10 ENCOUNTER — PATIENT OUTREACH (OUTPATIENT)
Dept: ADMINISTRATIVE | Facility: OTHER | Age: 55
End: 2020-06-10

## 2020-06-10 NOTE — PROGRESS NOTES
Care Everywhere: n/a  Immunization: updated  Health Maintenance: updated  Media Review: reviewed for outside colon cancer report  Legacy Review: n/a  Order placed: n/a  Upcoming appts:n/a

## 2020-06-11 ENCOUNTER — OFFICE VISIT (OUTPATIENT)
Dept: OBSTETRICS AND GYNECOLOGY | Facility: CLINIC | Age: 55
End: 2020-06-11
Payer: COMMERCIAL

## 2020-06-11 VITALS
TEMPERATURE: 98 F | WEIGHT: 144.94 LBS | SYSTOLIC BLOOD PRESSURE: 120 MMHG | HEIGHT: 60 IN | DIASTOLIC BLOOD PRESSURE: 70 MMHG | BODY MASS INDEX: 28.45 KG/M2

## 2020-06-11 DIAGNOSIS — Z12.31 ENCOUNTER FOR SCREENING MAMMOGRAM FOR BREAST CANCER: ICD-10-CM

## 2020-06-11 DIAGNOSIS — R30.0 DYSURIA: Primary | ICD-10-CM

## 2020-06-11 LAB
BILIRUB SERPL-MCNC: NEGATIVE MG/DL
BLOOD URINE, POC: NORMAL
CLARITY, POC UA: NORMAL
COLOR, POC UA: NORMAL
GLUCOSE UR QL STRIP: NEGATIVE
KETONES UR QL STRIP: NEGATIVE
LEUKOCYTE ESTERASE URINE, POC: NEGATIVE
NITRITE, POC UA: NEGATIVE
PH, POC UA: 5
PROTEIN, POC: NEGATIVE
SPECIFIC GRAVITY, POC UA: 1.01
UROBILINOGEN, POC UA: NORMAL

## 2020-06-11 PROCEDURE — 81002 POCT URINE DIPSTICK WITHOUT MICROSCOPE: ICD-10-PCS | Mod: S$GLB,,, | Performed by: OBSTETRICS & GYNECOLOGY

## 2020-06-11 PROCEDURE — 99396 PR PREVENTIVE VISIT,EST,40-64: ICD-10-PCS | Mod: 25,S$GLB,, | Performed by: OBSTETRICS & GYNECOLOGY

## 2020-06-11 PROCEDURE — 99999 PR PBB SHADOW E&M-EST. PATIENT-LVL III: CPT | Mod: PBBFAC,,, | Performed by: OBSTETRICS & GYNECOLOGY

## 2020-06-11 PROCEDURE — 99999 PR PBB SHADOW E&M-EST. PATIENT-LVL III: ICD-10-PCS | Mod: PBBFAC,,, | Performed by: OBSTETRICS & GYNECOLOGY

## 2020-06-11 PROCEDURE — 81002 URINALYSIS NONAUTO W/O SCOPE: CPT | Mod: S$GLB,,, | Performed by: OBSTETRICS & GYNECOLOGY

## 2020-06-11 PROCEDURE — 99396 PREV VISIT EST AGE 40-64: CPT | Mod: 25,S$GLB,, | Performed by: OBSTETRICS & GYNECOLOGY

## 2020-06-11 NOTE — PROGRESS NOTES
Subjective:       Patient ID: Randi Burger is a 54 y.o. female.    Chief Complaint:  Well Woman      History of Present Illness  - patient presents for annual exam. She finished Cipro for a UTI one month ago. Reports feeling mostly better but requests a u/a today for reassurance.     History reviewed. No pertinent past medical history.    Past Surgical History:   Procedure Laterality Date    HYSTERECTOMY      @38yrs of age    TONSILLECTOMY           Current Outpatient Medications:     calcium citrate-vitamin D3 315-200 mg (CITRACAL+D) 315-200 mg-unit per tablet, Take 1 tablet by mouth once daily., Disp: , Rfl:     meloxicam (MOBIC) 7.5 MG tablet, Take 1 tablet (7.5 mg total) by mouth once daily., Disp: 90 tablet, Rfl: 3    ciprofloxacin HCl (CIPRO) 500 MG tablet, Take 1 tablet (500 mg total) by mouth every 12 (twelve) hours. (Patient not taking: Reported on 2020), Disp: 14 tablet, Rfl: 0    estradiol (ESTRACE) 0.01 % (0.1 mg/gram) vaginal cream, Place 1 gram vaginally twice a week., Disp: 42.5 g, Rfl: 6    Review of patient's allergies indicates:  No Known Allergies    GYN & OB History  No LMP recorded (lmp unknown). Patient has had a hysterectomy.   Date of Last Pap: No result found    OB History    Para Term  AB Living   1 1 1     1   SAB TAB Ectopic Multiple Live Births           1      # Outcome Date GA Lbr Francisco J/2nd Weight Sex Delivery Anes PTL Lv   1 Term      Vag-Spont   MARRY       Social History     Socioeconomic History    Marital status:      Spouse name: Not on file    Number of children: Not on file    Years of education: Not on file    Highest education level: Not on file   Occupational History    Not on file   Social Needs    Financial resource strain: Not on file    Food insecurity:     Worry: Not on file     Inability: Not on file    Transportation needs:     Medical: Not on file     Non-medical: Not on file   Tobacco Use    Smoking status: Never Smoker     Smokeless tobacco: Never Used   Substance and Sexual Activity    Alcohol use: Yes     Comment: socially     Drug use: No    Sexual activity: Yes     Partners: Male     Birth control/protection: Surgical   Lifestyle    Physical activity:     Days per week: Not on file     Minutes per session: Not on file    Stress: Not on file   Relationships    Social connections:     Talks on phone: Not on file     Gets together: Not on file     Attends Cheondoism service: Not on file     Active member of club or organization: Not on file     Attends meetings of clubs or organizations: Not on file     Relationship status: Not on file   Other Topics Concern    Not on file   Social History Narrative    Not on file       Family History   Problem Relation Age of Onset    Diabetes Maternal Grandmother     Coronary artery disease Father     Heart attack Father     Hypertension Father     Thyroid disease Father     Cancer Mother     Macular degeneration Mother     Breast cancer Neg Hx     Colon cancer Neg Hx     Ovarian cancer Neg Hx     Blindness Neg Hx     Amblyopia Neg Hx     Cataracts Neg Hx     Glaucoma Neg Hx     Retinal detachment Neg Hx     Strabismus Neg Hx     Stroke Neg Hx        Review of Systems  Review of Systems   Respiratory: Negative for shortness of breath.    Cardiovascular: Negative for chest pain and palpitations.   Gastrointestinal: Negative for blood in stool, nausea and vomiting.   Genitourinary:        - see HPI   Skin: Negative for rash and wound.   Allergic/Immunologic: Negative for immunocompromised state.   Neurological: Negative for dizziness and syncope.   Hematological: Negative for adenopathy.   Psychiatric/Behavioral: Negative for behavioral problems.        Objective:     Vitals:    06/11/20 1408   BP: 120/70   Temp: 97.8 °F (36.6 °C)   Weight: 65.8 kg (144 lb 15.2 oz)   Height: 5' (1.524 m)       Physical Exam:   Constitutional: She is oriented to person, place, and time. She  appears well-developed and well-nourished.        Pulmonary/Chest: Right breast exhibits no mass, no nipple discharge, no skin change, no tenderness and no swelling. Left breast exhibits no mass, no nipple discharge, no skin change, no tenderness and no swelling. Breasts are symmetrical.        Abdominal: Soft. She exhibits no distension. There is no tenderness.     Genitourinary: Vagina normal. There is no tenderness or lesion on the right labia. There is no tenderness or lesion on the left labia. Uterus is absent. Right adnexum displays no mass, no tenderness and no fullness. Left adnexum displays no mass, no tenderness and no fullness. No vaginal discharge found. Cervix exhibits absence.           Musculoskeletal: Moves all extremeties.       Neurological: She is alert and oriented to person, place, and time.     Psychiatric: She has a normal mood and affect.        Assessment/ Plan:     Orders Placed This Encounter    Mammo Digital Screening Bilat w/ [facility]    POCT urine dipstick without microscope       Rnadi was seen today for well woman.    Diagnoses and all orders for this visit:    Dysuria  -     POCT urine dipstick without microscope    Encounter for screening mammogram for breast cancer  -     Mammo Digital Screening Bilat w/ [facility]; Future    - u/a negative.    Follow up in about 1 year (around 6/11/2021) for annual exam.

## 2020-11-05 ENCOUNTER — PATIENT MESSAGE (OUTPATIENT)
Dept: FAMILY MEDICINE | Facility: CLINIC | Age: 55
End: 2020-11-05

## 2020-11-05 DIAGNOSIS — M25.50 ARTHRALGIA, UNSPECIFIED JOINT: ICD-10-CM

## 2020-11-05 RX ORDER — MELOXICAM 7.5 MG/1
7.5 TABLET ORAL DAILY
Qty: 90 TABLET | Refills: 3 | Status: SHIPPED | OUTPATIENT
Start: 2020-11-05 | End: 2020-11-12 | Stop reason: SDUPTHER

## 2020-11-06 NOTE — TELEPHONE ENCOUNTER
Called [facility] in [place] and left a message stating that I inadvertently sent a prescription for Meloxicam 7.5 mg tabs (#90 with 3 RF). Left voicemail asking that this prescription be cancelled.

## 2020-11-12 ENCOUNTER — OFFICE VISIT (OUTPATIENT)
Dept: FAMILY MEDICINE | Facility: CLINIC | Age: 55
End: 2020-11-12
Payer: COMMERCIAL

## 2020-11-12 VITALS
HEART RATE: 92 BPM | WEIGHT: 149.94 LBS | DIASTOLIC BLOOD PRESSURE: 80 MMHG | HEIGHT: 60 IN | OXYGEN SATURATION: 97 % | SYSTOLIC BLOOD PRESSURE: 118 MMHG | TEMPERATURE: 98 F | BODY MASS INDEX: 29.44 KG/M2

## 2020-11-12 DIAGNOSIS — Z12.11 COLON CANCER SCREENING: ICD-10-CM

## 2020-11-12 DIAGNOSIS — G89.29 CHRONIC SACROILIAC PAIN: Primary | ICD-10-CM

## 2020-11-12 DIAGNOSIS — M25.50 ARTHRALGIA, UNSPECIFIED JOINT: ICD-10-CM

## 2020-11-12 DIAGNOSIS — M53.3 CHRONIC SACROILIAC PAIN: Primary | ICD-10-CM

## 2020-11-12 PROBLEM — R26.89 GAIT, ANTALGIC: Status: RESOLVED | Noted: 2019-10-03 | Resolved: 2020-11-12

## 2020-11-12 PROBLEM — R29.898 WEAKNESS OF BOTH LOWER EXTREMITIES: Status: RESOLVED | Noted: 2019-10-03 | Resolved: 2020-11-12

## 2020-11-12 PROBLEM — M79.671 FOOT PAIN, BILATERAL: Status: RESOLVED | Noted: 2019-10-03 | Resolved: 2020-11-12

## 2020-11-12 PROBLEM — M79.672 FOOT PAIN, BILATERAL: Status: RESOLVED | Noted: 2019-10-03 | Resolved: 2020-11-12

## 2020-11-12 PROBLEM — R26.89 POOR BALANCE: Status: RESOLVED | Noted: 2019-10-03 | Resolved: 2020-11-12

## 2020-11-12 PROCEDURE — 99999 PR PBB SHADOW E&M-EST. PATIENT-LVL III: ICD-10-PCS | Mod: PBBFAC,,, | Performed by: FAMILY MEDICINE

## 2020-11-12 PROCEDURE — 3008F PR BODY MASS INDEX (BMI) DOCUMENTED: ICD-10-PCS | Mod: CPTII,S$GLB,, | Performed by: FAMILY MEDICINE

## 2020-11-12 PROCEDURE — 3008F BODY MASS INDEX DOCD: CPT | Mod: CPTII,S$GLB,, | Performed by: FAMILY MEDICINE

## 2020-11-12 PROCEDURE — 1125F PR PAIN SEVERITY QUANTIFIED, PAIN PRESENT: ICD-10-PCS | Mod: S$GLB,,, | Performed by: FAMILY MEDICINE

## 2020-11-12 PROCEDURE — 99214 PR OFFICE/OUTPT VISIT, EST, LEVL IV, 30-39 MIN: ICD-10-PCS | Mod: S$GLB,,, | Performed by: FAMILY MEDICINE

## 2020-11-12 PROCEDURE — 99999 PR PBB SHADOW E&M-EST. PATIENT-LVL III: CPT | Mod: PBBFAC,,, | Performed by: FAMILY MEDICINE

## 2020-11-12 PROCEDURE — 1125F AMNT PAIN NOTED PAIN PRSNT: CPT | Mod: S$GLB,,, | Performed by: FAMILY MEDICINE

## 2020-11-12 PROCEDURE — 99214 OFFICE O/P EST MOD 30 MIN: CPT | Mod: S$GLB,,, | Performed by: FAMILY MEDICINE

## 2020-11-12 RX ORDER — MELOXICAM 7.5 MG/1
7.5 TABLET ORAL DAILY
Qty: 30 TABLET | Refills: 5 | Status: SHIPPED | OUTPATIENT
Start: 2020-11-12

## 2020-11-12 NOTE — PROGRESS NOTES
Subjective:       Patient ID: Randi Burger is a 55 y.o. female.    Chief Complaint: Hip Pain  Pt notes chronic sacroiliac pain. Pt notes pain since falling 4 years ago. Has been taking Meloxicam as needed for pain. Pt notes tightness in her pelvic area. Pt notes exacerbation of her pain with working on a ladder. Pain is worsened by prolonged sitting and activity. Rates pain as 3-4/10.  Hip Pain     Pt s/p thoracic CT and MRI in 2013 which showed mild spondylosis and bulging disc at T5-6.   Review of Systems   Constitutional: Negative for activity change and unexpected weight change.   HENT: Negative for hearing loss and trouble swallowing.    Eyes: Negative for discharge.   Respiratory: Negative for chest tightness and wheezing.    Cardiovascular: Negative for chest pain and palpitations.   Gastrointestinal: Negative for blood in stool, constipation, diarrhea and vomiting.   Genitourinary: Negative for difficulty urinating and hematuria.   Musculoskeletal: Positive for arthralgias.   Neurological: Negative for headaches.   Psychiatric/Behavioral: Negative for dysphoric mood.       Objective:      Physical Exam  Vitals signs reviewed.   Constitutional:       General: She is not in acute distress.     Appearance: Normal appearance. She is not ill-appearing.   HENT:      Head: Normocephalic and atraumatic.   Neck:      Musculoskeletal: Normal range of motion and neck supple.   Cardiovascular:      Rate and Rhythm: Normal rate and regular rhythm.      Heart sounds: No murmur. No gallop.    Pulmonary:      Effort: Pulmonary effort is normal.      Breath sounds: Normal breath sounds. No wheezing or rales.   Abdominal:      General: Bowel sounds are normal.      Palpations: Abdomen is soft.      Tenderness: There is no abdominal tenderness. There is no guarding.   Musculoskeletal:      Cervical back: She exhibits normal range of motion, no tenderness and no bony tenderness.      Thoracic back: She exhibits normal range  of motion, no tenderness and no bony tenderness.      Lumbar back: She exhibits tenderness. She exhibits normal range of motion and no bony tenderness.   Neurological:      General: No focal deficit present.      Mental Status: She is alert and oriented to person, place, and time.      Sensory: No sensory deficit.      Motor: No weakness.      Coordination: Coordination normal.      Gait: Gait normal.      Deep Tendon Reflexes: Reflexes normal.   Psychiatric:         Mood and Affect: Mood normal.         Assessment:       See plan  Plan:       Chronic sacroiliac pain  -     Ambulatory referral/consult to Pain Clinic; Future; Expected date: 11/19/2020    Arthralgia, unspecified joint  -     meloxicam (MOBIC) 7.5 MG tablet; Take 1 tablet (7.5 mg total) by mouth once daily.  Dispense: 30 tablet; Refill: 5    Colon cancer screening  -     Fecal Immunochemical Test (iFOBT); Future; Expected date: 11/12/2020    BMI 29.0-29.9,adult  The patient's BMI has been recorded in the chart. The patient has been provided educational materials regarding the benefits of attaining and maintaining a normal weight. We will continue to address and follow this issue during follow up visits.    F/U as needed.

## 2020-11-14 NOTE — PROGRESS NOTES
[employer] was given to patient on 11/14/2020 11:10 AM         Answers for HPI/ROS submitted by the patient on 11/11/2020   activity change: No  unexpected weight change: No  hearing loss: No  trouble swallowing: No  eye discharge: No  chest tightness: No  wheezing: No  chest pain: No  palpitations: No  blood in stool: No  constipation: No  vomiting: No  diarrhea: No  difficulty urinating: No  hematuria: No  arthralgias: Yes  headaches: No  dysphoric mood: No

## 2020-11-16 ENCOUNTER — OFFICE VISIT (OUTPATIENT)
Dept: PAIN MEDICINE | Facility: CLINIC | Age: 55
End: 2020-11-16
Payer: COMMERCIAL

## 2020-11-16 ENCOUNTER — HOSPITAL ENCOUNTER (OUTPATIENT)
Dept: RADIOLOGY | Facility: HOSPITAL | Age: 55
Discharge: HOME | End: 2020-11-16
Attending: PAIN MEDICINE
Payer: COMMERCIAL

## 2020-11-16 VITALS
SYSTOLIC BLOOD PRESSURE: 114 MMHG | WEIGHT: 149.38 LBS | HEART RATE: 70 BPM | BODY MASS INDEX: 29.18 KG/M2 | DIASTOLIC BLOOD PRESSURE: 73 MMHG

## 2020-11-16 DIAGNOSIS — G89.29 CHRONIC SACROILIAC PAIN: ICD-10-CM

## 2020-11-16 DIAGNOSIS — S33.2XXS: ICD-10-CM

## 2020-11-16 DIAGNOSIS — M79.2 NEUROPATHIC PAIN: ICD-10-CM

## 2020-11-16 DIAGNOSIS — M53.3 CHRONIC SACROILIAC PAIN: ICD-10-CM

## 2020-11-16 DIAGNOSIS — S33.2XXS: Primary | ICD-10-CM

## 2020-11-16 PROBLEM — S33.2XXA: Status: ACTIVE | Noted: 2020-11-16

## 2020-11-16 PROCEDURE — 99999 PR PBB SHADOW E&M-EST. PATIENT-LVL III: ICD-10-PCS | Mod: PBBFAC,,, | Performed by: PAIN MEDICINE

## 2020-11-16 PROCEDURE — 99244 OFF/OP CNSLTJ NEW/EST MOD 40: CPT | Mod: S$GLB,,, | Performed by: PAIN MEDICINE

## 2020-11-16 PROCEDURE — 72220 XR SACRUM AND COCCYX: ICD-10-PCS | Mod: 26,,, | Performed by: RADIOLOGY

## 2020-11-16 PROCEDURE — 1125F AMNT PAIN NOTED PAIN PRSNT: CPT | Mod: S$GLB,,, | Performed by: PAIN MEDICINE

## 2020-11-16 PROCEDURE — 72220 X-RAY EXAM SACRUM TAILBONE: CPT | Mod: 26,,, | Performed by: RADIOLOGY

## 2020-11-16 PROCEDURE — 3008F BODY MASS INDEX DOCD: CPT | Mod: CPTII,S$GLB,, | Performed by: PAIN MEDICINE

## 2020-11-16 PROCEDURE — 99244 PR OFFICE CONSULTATION,LEVEL IV: ICD-10-PCS | Mod: S$GLB,,, | Performed by: PAIN MEDICINE

## 2020-11-16 PROCEDURE — 3008F PR BODY MASS INDEX (BMI) DOCUMENTED: ICD-10-PCS | Mod: CPTII,S$GLB,, | Performed by: PAIN MEDICINE

## 2020-11-16 PROCEDURE — 99999 PR PBB SHADOW E&M-EST. PATIENT-LVL III: CPT | Mod: PBBFAC,,, | Performed by: PAIN MEDICINE

## 2020-11-16 PROCEDURE — 72220 X-RAY EXAM SACRUM TAILBONE: CPT | Mod: TC,FY

## 2020-11-16 PROCEDURE — 1125F PR PAIN SEVERITY QUANTIFIED, PAIN PRESENT: ICD-10-PCS | Mod: S$GLB,,, | Performed by: PAIN MEDICINE

## 2020-11-16 NOTE — LETTER
November 16, 2020      Hanny Liu MD  [address]  [place] [id number]           [facility]  [address]  [place] [address]  Phone: [phone]          Patient: Randi Burger   MR Number: [id number]   YOB: 1965   Date of Visit: 11/16/2020       Dear Dr. Hanny Liu:    Thank you for referring Randi Burger to me for evaluation. Attached you will find relevant portions of my assessment and plan of care.    If you have questions, please do not hesitate to call me. I look forward to following Randi Burger along with you.    Sincerely,    Bethany Hairston Jr., MD    Enclosure  CC:  No Recipients    If you would like to receive this communication electronically, please contact [email or web address] or [phone] to request more information on [employer] access.    For providers and/or their staff who would like to refer a patient to [facility], please contact us through our one-stop-shop provider referral line, [facility], at [phone].    If you feel you have received this communication in error or would no longer like to receive these types of communications, please e-mail [email or web address]

## 2020-11-16 NOTE — PROGRESS NOTES
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  Patient states that pain is been present ever since this incident but has fluctuated over time.  She also reports that her cause 6 was mobile for many years in that she was able to move it back into place at times.  Currently, she believes the coccyx to be stuck in place but off to the side.  She states pain associated with intercourse (though this has improved over time) and pain associated with defecation.  She reports feeling tight pains that shoot and radiate into the hips and thighs associated with intercourse and defecation.  Approximately 4 years ago, she decided to engage activities to improve spine health and posture resulting in overall decrease, but over the past few months, the sacral pain has been worsening.  As a secondary complaint, she reports midthoracic back pain due to degenerative disc disease.  She presents an old MRI report from 2013 showing extrusion of the nucleus polyposis at T5-6 resulting bruising of the spinal cord and central canal stenosis.    Location: Bilateral Sacroiliac  Onset: +10 years  Current Pain Score: 3/10  Daily Pain of Range: 3-5/10  Quality: Burning, Tight, Deep, Superficial, Numb and Shooting  Radiation: bilateral hips and thighs  Worsened by: exercise, lying down and standing for more than 30 minutes  Improved by: massage and rest    Previous Therapies:  PT/OT:  Yes  HEP:  Yes  Interventions:  Denies injections  Surgery:  Denies thoracic or sacral surgery  Medications:    - NSAIDS:   - MSK Relaxants:   - TCAs:   - SNRIs:   - Topicals:   - Anticonvulsants:  - Opioids:     Current Pain Medications:  1. Meloxicam 7.5mg once daily p.r.n.  2. Tylenol p.r.n.    Review of Systems:  Review of Systems   Constitutional: Negative for chills and fever.   HENT: Negative for nosebleeds.    Eyes: Negative for blurred vision and pain.   Respiratory: Negative for hemoptysis.    Cardiovascular: Negative for chest pain and palpitations.   Gastrointestinal: Negative for heartburn, nausea and vomiting.   Genitourinary: Negative for dysuria and hematuria.   Musculoskeletal: Positive for back pain and myalgias. Negative for falls.   Skin: Negative for rash.   Neurological: Positive for tingling. Negative for dizziness, sensory change, focal weakness, seizures, loss of consciousness, weakness and headaches.   Endo/Heme/Allergies: Does not bruise/bleed easily.   Psychiatric/Behavioral: Negative for hallucinations.       History:    Current Outpatient Medications:     calcium citrate-vitamin D3 315-200 mg (CITRACAL+D) 315-200 mg-unit per tablet, Take 1 tablet by mouth once daily., Disp: , Rfl:     meloxicam (MOBIC) 7.5 MG tablet, Take 1 tablet (7.5 mg total) by mouth once daily., Disp: 30 tablet, Rfl: 5    estradiol (ESTRACE) 0.01 % (0.1 mg/gram) vaginal cream, Place 1 gram vaginally twice a week., Disp: 42.5 g, Rfl: 6    No past medical history on file.    Past Surgical History:   Procedure Laterality Date    HYSTERECTOMY      @38yrs of age    TONSILLECTOMY         Family History   Problem Relation Age of Onset    Diabetes Maternal Grandmother     Coronary artery disease Father     Heart attack Father     Hypertension Father     Thyroid disease Father     Cancer Mother     Macular degeneration Mother     Breast cancer Neg Hx     Colon cancer Neg Hx     Ovarian cancer Neg Hx     Blindness Neg Hx     Amblyopia Neg Hx     Cataracts Neg Hx     Glaucoma Neg Hx     Retinal detachment Neg Hx      Strabismus Neg Hx     Stroke Neg Hx        Social History     Socioeconomic History    Marital status:      Spouse name: Not on file    Number of children: Not on file    Years of education: Not on file    Highest education level: Not on file   Occupational History    Not on file   Social Needs    Financial resource strain: Not on file    Food insecurity     Worry: Not on file     Inability: Not on file    Transportation needs     Medical: Not on file     Non-medical: Not on file   Tobacco Use    Smoking status: Never Smoker    Smokeless tobacco: Never Used   Substance and Sexual Activity    Alcohol use: Yes     Frequency: 2-4 times a month     Drinks per session: 1 or 2     Binge frequency: Never     Comment: socially     Drug use: No    Sexual activity: Yes     Partners: Male     Birth control/protection: Surgical   Lifestyle    Physical activity     Days per week: Not on file     Minutes per session: Not on file    Stress: Not on file   Relationships    Social connections     Talks on phone: Not on file     Gets together: Not on file     Attends Congregational service: Not on file     Active member of club or organization: Not on file     Attends meetings of clubs or organizations: Not on file     Relationship status: Not on file   Other Topics Concern    Not on file   Social History Narrative    Not on file       Review of patient's allergies indicates:  No Known Allergies    Physical Exam:  Vitals:    11/16/20 0814   Weight: 67.8 kg (149 lb 6.2 oz)   PainSc:   3     General    Nursing note and vitals reviewed.  Constitutional: She is oriented to person, place, and time. She appears well-developed and well-nourished. No distress.   HENT:   Head: Normocephalic and atraumatic.   Nose: Nose normal.   Eyes: Conjunctivae and EOM are normal. Pupils are equal, round, and reactive to light. Right eye exhibits no discharge. Left eye exhibits no discharge. No scleral icterus.   Neck: No JVD  present.   Cardiovascular: Intact distal pulses.    Pulmonary/Chest: Effort normal. No respiratory distress.   Abdominal: She exhibits no distension.   Neurological: She is alert and oriented to person, place, and time. Coordination normal.   Psychiatric: She has a normal mood and affect. Her behavior is normal. Judgment and thought content normal.     General Musculoskeletal Exam   Gait: normal     Back (L-Spine & T-Spine) / Neck (C-Spine) Exam     Tenderness Posterior midline palpation reveals tenderness of the Sacrum and Lower L-Spine. Right paramedian tenderness of the Sacrum. Left paramedian tenderness of the Sacrum.     Back (L-Spine & T-Spine) Range of Motion   Extension: normal   Flexion: normal   Lateral bend right: normal   Lateral bend left: normal   Rotation right: normal   Rotation left: normal     Other   Spinal Kyphosis:  Absent  Spinal Lordosis:  Absent    Comments:  Facet loading negative.  Excellent range of motion with extension flexion and lumbosacral spine.  Significant tenderness in the midline just below the PSIS consistent with the location sacrococcygeal junction.      Muscle Strength   Right Lower Extremity   Hip Flexion: 5/5   Quadriceps:  5/5   Hamstrin/5   Gastrocsoleus:  5/5   Left Lower Extremity   Hip Flexion: 5/5   Quadriceps:  5/5   Hamstrin/5   Gastrocsoleus:  5/5       Imaging:  None    Labs:  BMP  Lab Results   Component Value Date     2018    K 4.0 2018     2018    CO2 25 2018    BUN 10 2018    CREATININE 0.7 2018    CALCIUM 10.1 2018    ANIONGAP 11 2018    ESTGFRAFRICA >60.0 2018    EGFRNONAA >60.0 2018     Lab Results   Component Value Date    ALT 21 2018    AST 16 2018    ALKPHOS 84 2018    BILITOT 0.5 2018       Assessment:  Problem List Items Addressed This Visit     Closed dislocation of coccyx - Primary    Relevant Orders    X-Ray Sacrum And Coccyx    MRI Sacroiliac  Joints Without Contrast    Neuropathic pain    Relevant Orders    X-Ray Sacrum And Coccyx    MRI Sacroiliac Joints Without Contrast    Chronic sacroiliac pain    Relevant Orders    X-Ray Sacrum And Coccyx    MRI Sacroiliac Joints Without Contrast          11/16/20 - Randi Burger is a 55 y.o. female with coccydynia secondary to coccygeal fracture and dislocation approximately 30 years ago.  Patient is reporting some neuropathic symptoms, likely related to irritation of the sacrococcygeal nerves and/or ganglion impar.  In proper orientation of the coccyx could certainly interfere with intercourse and with defecation, both of which this patient describes as painful.  She states increasing difficulty with prolong sitting.  This is all in the context of self improvement over the past 4 years and commitment to improved core strength.  Despite her efforts, the coccygeal pain has continued to worsen.  Therefore, I have recommended that we obtain advanced imaging and x-ray of the coccyx to better understand the pathology.  Patient was advised that there were 3 main treatment pathways which include medication management, sacrococcygeal/ganglion impar injection, and resection coccyx.  Patient states her preference for avoiding daily medications and currently only uses her meloxicam as needed.  Therefore our most likely first step will be an injection.    : Reviewed and consistent with medication use as prescribed.    Treatment Plan:   Procedures: Considering Ganglion Impar Block for Coccydynia.  PT/OT/HEP: No additional recommended at this time.  Patient is doing well with her own HEP.  Medications: No changes recommended at this time.  Patient states preference to avoid daily meds, but neuropathic agents would be a good option.  Labs: reviewed and medications are appropriately dosed for current hepatorenal function.  Imaging: MRI Sacrum/Coccyc and X-Ray Sacrum/Coccyx ordered today    Follow Up: RTC 2 weeks    [place]eth[place]  Eliseo Hairston Jr, MD  Interventional Pain Medicine / Anesthesiology    Disclaimer: This note was partly generated using dictation software which may occasionally result in transcription errors.

## 2020-11-18 ENCOUNTER — HOSPITAL ENCOUNTER (OUTPATIENT)
Dept: RADIOLOGY | Facility: HOSPITAL | Age: 55
Discharge: HOME | End: 2020-11-18
Attending: PAIN MEDICINE
Payer: COMMERCIAL

## 2020-11-18 DIAGNOSIS — S33.2XXS: ICD-10-CM

## 2020-11-18 DIAGNOSIS — M79.2 NEUROPATHIC PAIN: ICD-10-CM

## 2020-11-18 DIAGNOSIS — M53.3 CHRONIC SACROILIAC PAIN: ICD-10-CM

## 2020-11-18 DIAGNOSIS — G89.29 CHRONIC SACROILIAC PAIN: ICD-10-CM

## 2020-11-18 PROCEDURE — 72195 MRI SACROILIAC JOINTS WITHOUT CONTRAST: ICD-10-PCS | Mod: 26,,, | Performed by: RADIOLOGY

## 2020-11-18 PROCEDURE — 72195 MRI PELVIS W/O DYE: CPT | Mod: 26,,, | Performed by: RADIOLOGY

## 2020-11-18 PROCEDURE — 72195 MRI PELVIS W/O DYE: CPT | Mod: TC

## 2020-11-19 ENCOUNTER — LAB VISIT (OUTPATIENT)
Dept: LAB | Facility: HOSPITAL | Age: 55
End: 2020-11-19
Attending: FAMILY MEDICINE
Payer: COMMERCIAL

## 2020-11-19 DIAGNOSIS — Z12.11 COLON CANCER SCREENING: ICD-10-CM

## 2020-11-19 PROCEDURE — 82274 ASSAY TEST FOR BLOOD FECAL: CPT

## 2020-11-23 ENCOUNTER — VITALS (OUTPATIENT)
Dept: PAIN MEDICINE | Facility: CLINIC | Age: 55
End: 2020-11-23
Payer: COMMERCIAL

## 2020-11-23 DIAGNOSIS — M53.3 CHRONIC SACROILIAC PAIN: Primary | ICD-10-CM

## 2020-11-23 DIAGNOSIS — S33.2XXS: ICD-10-CM

## 2020-11-23 DIAGNOSIS — G89.29 CHRONIC SACROILIAC PAIN: Primary | ICD-10-CM

## 2020-11-23 PROCEDURE — 96372 PR INJECTION,THERAP/PROPH/DIAG2ST, IM OR SUBCUT: ICD-10-PCS | Mod: S$GLB,,, | Performed by: PAIN MEDICINE

## 2020-11-23 PROCEDURE — 96372 THER/PROPH/DIAG INJ SC/IM: CPT | Mod: S$GLB,,, | Performed by: PAIN MEDICINE

## 2020-11-23 RX ORDER — KETOROLAC TROMETHAMINE 30 MG/ML
30 INJECTION, SOLUTION INTRAMUSCULAR; INTRAVENOUS
Status: COMPLETED | OUTPATIENT
Start: 2020-11-23 | End: 2020-11-23

## 2020-11-23 RX ADMIN — KETOROLAC TROMETHAMINE 30 MG: 30 INJECTION, SOLUTION INTRAMUSCULAR; INTRAVENOUS at 04:11

## 2020-11-25 LAB — HEMOCCULT STL QL IA: NEGATIVE

## 2021-05-31 ENCOUNTER — TELEPHONE (OUTPATIENT)
Dept: OBSTETRICS AND GYNECOLOGY | Facility: CLINIC | Age: 56
End: 2021-05-31

## 2021-05-31 DIAGNOSIS — Z12.31 ENCOUNTER FOR SCREENING MAMMOGRAM FOR BREAST CANCER: Primary | ICD-10-CM

## 2021-07-06 ENCOUNTER — HOSPITAL ENCOUNTER (OUTPATIENT)
Dept: RADIOLOGY | Facility: HOSPITAL | Age: 56
Discharge: HOME | End: 2021-07-06
Attending: OBSTETRICS & GYNECOLOGY
Payer: COMMERCIAL

## 2021-07-06 DIAGNOSIS — Z12.31 ENCOUNTER FOR SCREENING MAMMOGRAM FOR BREAST CANCER: ICD-10-CM

## 2021-07-06 PROCEDURE — 77067 MAMMO DIGITAL SCREENING BILAT WITH TOMO: ICD-10-PCS | Mod: 26,,, | Performed by: RADIOLOGY

## 2021-07-06 PROCEDURE — 77067 SCR MAMMO BI INCL CAD: CPT | Mod: TC

## 2021-07-06 PROCEDURE — 77063 BREAST TOMOSYNTHESIS BI: CPT | Mod: 26,,, | Performed by: RADIOLOGY

## 2021-07-06 PROCEDURE — 77063 MAMMO DIGITAL SCREENING BILAT WITH TOMO: ICD-10-PCS | Mod: 26,,, | Performed by: RADIOLOGY

## 2021-07-06 PROCEDURE — 77067 SCR MAMMO BI INCL CAD: CPT | Mod: 26,,, | Performed by: RADIOLOGY

## 2021-07-08 ENCOUNTER — PATIENT OUTREACH (OUTPATIENT)
Dept: ADMINISTRATIVE | Facility: OTHER | Age: 56
End: 2021-07-08

## 2021-07-12 ENCOUNTER — OFFICE VISIT (OUTPATIENT)
Dept: OBSTETRICS AND GYNECOLOGY | Facility: CLINIC | Age: 56
End: 2021-07-12
Attending: OBSTETRICS & GYNECOLOGY
Payer: COMMERCIAL

## 2021-07-12 VITALS
SYSTOLIC BLOOD PRESSURE: 122 MMHG | WEIGHT: 157.19 LBS | BODY MASS INDEX: 30.86 KG/M2 | DIASTOLIC BLOOD PRESSURE: 78 MMHG | HEIGHT: 60 IN

## 2021-07-12 DIAGNOSIS — N83.202 LEFT OVARIAN CYST: ICD-10-CM

## 2021-07-12 DIAGNOSIS — Z01.419 ENCOUNTER FOR GYNECOLOGICAL EXAMINATION: Primary | ICD-10-CM

## 2021-07-12 PROCEDURE — 99999 PR PBB SHADOW E&M-EST. PATIENT-LVL III: CPT | Mod: PBBFAC,,, | Performed by: OBSTETRICS & GYNECOLOGY

## 2021-07-12 PROCEDURE — 1126F PR PAIN SEVERITY QUANTIFIED, NO PAIN PRESENT: ICD-10-PCS | Mod: S$GLB,,, | Performed by: OBSTETRICS & GYNECOLOGY

## 2021-07-12 PROCEDURE — 99999 PR PBB SHADOW E&M-EST. PATIENT-LVL III: ICD-10-PCS | Mod: PBBFAC,,, | Performed by: OBSTETRICS & GYNECOLOGY

## 2021-07-12 PROCEDURE — 99396 PR PREVENTIVE VISIT,EST,40-64: ICD-10-PCS | Mod: S$GLB,,, | Performed by: OBSTETRICS & GYNECOLOGY

## 2021-07-12 PROCEDURE — 3008F PR BODY MASS INDEX (BMI) DOCUMENTED: ICD-10-PCS | Mod: CPTII,S$GLB,, | Performed by: OBSTETRICS & GYNECOLOGY

## 2021-07-12 PROCEDURE — 3008F BODY MASS INDEX DOCD: CPT | Mod: CPTII,S$GLB,, | Performed by: OBSTETRICS & GYNECOLOGY

## 2021-07-12 PROCEDURE — 99396 PREV VISIT EST AGE 40-64: CPT | Mod: S$GLB,,, | Performed by: OBSTETRICS & GYNECOLOGY

## 2021-07-12 PROCEDURE — 1126F AMNT PAIN NOTED NONE PRSNT: CPT | Mod: S$GLB,,, | Performed by: OBSTETRICS & GYNECOLOGY

## 2021-07-12 RX ORDER — IPRATROPIUM BROMIDE 21 UG/1
SPRAY, METERED NASAL
COMMUNITY
Start: 2021-05-17

## 2021-07-12 RX ORDER — ESTRADIOL 0.1 MG/G
CREAM VAGINAL
Qty: 42.5 G | Refills: 3 | Status: SHIPPED | OUTPATIENT
Start: 2021-07-12

## 2021-07-12 RX ORDER — AZITHROMYCIN 250 MG/1
TABLET, FILM COATED ORAL
COMMUNITY
Start: 2021-05-17 | End: 2021-07-12

## 2021-07-12 RX ORDER — BENZONATATE 100 MG/1
CAPSULE ORAL
COMMUNITY
Start: 2021-05-17 | End: 2021-07-12

## 2022-01-10 ENCOUNTER — PATIENT MESSAGE (OUTPATIENT)
Dept: ADMINISTRATIVE | Facility: HOSPITAL | Age: 57
End: 2022-01-10
Payer: COMMERCIAL

## 2022-05-31 ENCOUNTER — PATIENT MESSAGE (OUTPATIENT)
Dept: ADMINISTRATIVE | Facility: HOSPITAL | Age: 57
End: 2022-05-31
Payer: COMMERCIAL

## 2022-07-20 DIAGNOSIS — Z12.31 OTHER SCREENING MAMMOGRAM: ICD-10-CM

## 2022-07-21 ENCOUNTER — TELEPHONE (OUTPATIENT)
Dept: INTERNAL MEDICINE | Facility: CLINIC | Age: 57
End: 2022-07-21
Payer: COMMERCIAL

## 2022-07-21 NOTE — TELEPHONE ENCOUNTER
LVM notifying pt I had an order for her annual mammo. Request she call us back so that we can schedule a date/time/place that meets her needs.